# Patient Record
Sex: MALE | Race: WHITE | NOT HISPANIC OR LATINO | URBAN - METROPOLITAN AREA
[De-identification: names, ages, dates, MRNs, and addresses within clinical notes are randomized per-mention and may not be internally consistent; named-entity substitution may affect disease eponyms.]

---

## 2021-07-29 ENCOUNTER — INPATIENT (INPATIENT)
Facility: HOSPITAL | Age: 86
LOS: 7 days | Discharge: HOME | DRG: 215 | End: 2021-08-06
Attending: STUDENT IN AN ORGANIZED HEALTH CARE EDUCATION/TRAINING PROGRAM | Admitting: STUDENT IN AN ORGANIZED HEALTH CARE EDUCATION/TRAINING PROGRAM
Payer: MEDICARE

## 2021-07-29 VITALS
DIASTOLIC BLOOD PRESSURE: 104 MMHG | HEART RATE: 144 BPM | OXYGEN SATURATION: 97 % | RESPIRATION RATE: 20 BRPM | WEIGHT: 190.04 LBS | TEMPERATURE: 98 F | HEIGHT: 70 IN | SYSTOLIC BLOOD PRESSURE: 151 MMHG

## 2021-07-29 LAB
ALBUMIN SERPL ELPH-MCNC: 4 G/DL — SIGNIFICANT CHANGE UP (ref 3.3–5.2)
ALP SERPL-CCNC: 91 U/L — SIGNIFICANT CHANGE UP (ref 40–120)
ALT FLD-CCNC: 26 U/L — SIGNIFICANT CHANGE UP
ANION GAP SERPL CALC-SCNC: 13 MMOL/L — SIGNIFICANT CHANGE UP (ref 5–17)
APTT BLD: 31.3 SEC — SIGNIFICANT CHANGE UP (ref 27.5–35.5)
AST SERPL-CCNC: 65 U/L — HIGH
BASOPHILS # BLD AUTO: 0.05 K/UL — SIGNIFICANT CHANGE UP (ref 0–0.2)
BASOPHILS NFR BLD AUTO: 0.5 % — SIGNIFICANT CHANGE UP (ref 0–2)
BILIRUB SERPL-MCNC: 1 MG/DL — SIGNIFICANT CHANGE UP (ref 0.4–2)
BUN SERPL-MCNC: 21.2 MG/DL — HIGH (ref 8–20)
CALCIUM SERPL-MCNC: 9.4 MG/DL — SIGNIFICANT CHANGE UP (ref 8.6–10.2)
CHLORIDE SERPL-SCNC: 102 MMOL/L — SIGNIFICANT CHANGE UP (ref 98–107)
CO2 SERPL-SCNC: 22 MMOL/L — SIGNIFICANT CHANGE UP (ref 22–29)
CREAT SERPL-MCNC: 1 MG/DL — SIGNIFICANT CHANGE UP (ref 0.5–1.3)
EOSINOPHIL # BLD AUTO: 0.1 K/UL — SIGNIFICANT CHANGE UP (ref 0–0.5)
EOSINOPHIL NFR BLD AUTO: 1 % — SIGNIFICANT CHANGE UP (ref 0–6)
GLUCOSE SERPL-MCNC: 116 MG/DL — HIGH (ref 70–99)
HCT VFR BLD CALC: 46 % — SIGNIFICANT CHANGE UP (ref 39–50)
HGB BLD-MCNC: 15.2 G/DL — SIGNIFICANT CHANGE UP (ref 13–17)
IMM GRANULOCYTES NFR BLD AUTO: 0.4 % — SIGNIFICANT CHANGE UP (ref 0–1.5)
INR BLD: 1.06 RATIO — SIGNIFICANT CHANGE UP (ref 0.88–1.16)
LYMPHOCYTES # BLD AUTO: 2.48 K/UL — SIGNIFICANT CHANGE UP (ref 1–3.3)
LYMPHOCYTES # BLD AUTO: 25.1 % — SIGNIFICANT CHANGE UP (ref 13–44)
MAGNESIUM SERPL-MCNC: 2 MG/DL — SIGNIFICANT CHANGE UP (ref 1.6–2.6)
MCHC RBC-ENTMCNC: 30.2 PG — SIGNIFICANT CHANGE UP (ref 27–34)
MCHC RBC-ENTMCNC: 33 GM/DL — SIGNIFICANT CHANGE UP (ref 32–36)
MCV RBC AUTO: 91.3 FL — SIGNIFICANT CHANGE UP (ref 80–100)
MONOCYTES # BLD AUTO: 0.9 K/UL — SIGNIFICANT CHANGE UP (ref 0–0.9)
MONOCYTES NFR BLD AUTO: 9.1 % — SIGNIFICANT CHANGE UP (ref 2–14)
NEUTROPHILS # BLD AUTO: 6.3 K/UL — SIGNIFICANT CHANGE UP (ref 1.8–7.4)
NEUTROPHILS NFR BLD AUTO: 63.9 % — SIGNIFICANT CHANGE UP (ref 43–77)
NT-PROBNP SERPL-SCNC: 9980 PG/ML — HIGH (ref 0–300)
PLATELET # BLD AUTO: 183 K/UL — SIGNIFICANT CHANGE UP (ref 150–400)
POTASSIUM SERPL-MCNC: 5.1 MMOL/L — SIGNIFICANT CHANGE UP (ref 3.5–5.3)
POTASSIUM SERPL-SCNC: 5.1 MMOL/L — SIGNIFICANT CHANGE UP (ref 3.5–5.3)
PROT SERPL-MCNC: 6.8 G/DL — SIGNIFICANT CHANGE UP (ref 6.6–8.7)
PROTHROM AB SERPL-ACNC: 12.3 SEC — SIGNIFICANT CHANGE UP (ref 10.6–13.6)
RBC # BLD: 5.04 M/UL — SIGNIFICANT CHANGE UP (ref 4.2–5.8)
RBC # FLD: 13.8 % — SIGNIFICANT CHANGE UP (ref 10.3–14.5)
SODIUM SERPL-SCNC: 137 MMOL/L — SIGNIFICANT CHANGE UP (ref 135–145)
TROPONIN T SERPL-MCNC: 0.34 NG/ML — HIGH (ref 0–0.06)
WBC # BLD: 9.87 K/UL — SIGNIFICANT CHANGE UP (ref 3.8–10.5)
WBC # FLD AUTO: 9.87 K/UL — SIGNIFICANT CHANGE UP (ref 3.8–10.5)

## 2021-07-29 PROCEDURE — 99291 CRITICAL CARE FIRST HOUR: CPT

## 2021-07-29 PROCEDURE — 71045 X-RAY EXAM CHEST 1 VIEW: CPT | Mod: 26

## 2021-07-29 RX ORDER — ASPIRIN/CALCIUM CARB/MAGNESIUM 324 MG
162 TABLET ORAL ONCE
Refills: 0 | Status: COMPLETED | OUTPATIENT
Start: 2021-07-29 | End: 2021-07-29

## 2021-07-29 RX ORDER — METOPROLOL TARTRATE 50 MG
25 TABLET ORAL ONCE
Refills: 0 | Status: COMPLETED | OUTPATIENT
Start: 2021-07-29 | End: 2021-07-29

## 2021-07-29 RX ORDER — METOPROLOL TARTRATE 50 MG
5 TABLET ORAL ONCE
Refills: 0 | Status: COMPLETED | OUTPATIENT
Start: 2021-07-29 | End: 2021-07-29

## 2021-07-29 RX ADMIN — Medication 5 MILLIGRAM(S): at 22:50

## 2021-07-29 RX ADMIN — Medication 25 MILLIGRAM(S): at 23:52

## 2021-07-29 NOTE — ED ADULT TRIAGE NOTE - MEANS OF ARRIVAL
Letter by Peter Devries at      Author: Peter Devries Service: -- Author Type: --    Filed:  Encounter Date: 4/11/2019 Status: (Other)          April 11, 2019      Bridgette Dubose  [address]  [place] [address]      Dear Bridgette Dubose,    We have processed your request for proxy access to [employer]. If you did not make a request to dvaide proxy access to an individual, please contact us immediately at [phone].    Through proxy access, your family member or other individual you approve, will be provided secure online access to information regarding your health. Through [employer], they will be able to review instructions from your health care provider, send a secure message to your provider, view test results, manage your appointments and more.    Again, thank you for registering for [employer]. Our team looks forward to partnering with you in managing your medical care and supporting healthy behaviors.     Thank you for choosing [facility].    Sincerely,    [email or web address]    If you have any further questions, please contact our [employer] Support Team by phone [phone] or email, [email or web address].             
stretcher

## 2021-07-30 DIAGNOSIS — I48.91 UNSPECIFIED ATRIAL FIBRILLATION: ICD-10-CM

## 2021-07-30 DIAGNOSIS — I21.4 NON-ST ELEVATION (NSTEMI) MYOCARDIAL INFARCTION: ICD-10-CM

## 2021-07-30 LAB
A1C WITH ESTIMATED AVERAGE GLUCOSE RESULT: 5.5 % — SIGNIFICANT CHANGE UP (ref 4–5.6)
APTT BLD: 171.6 SEC — CRITICAL HIGH (ref 27.5–35.5)
CHOLEST SERPL-MCNC: 176 MG/DL — SIGNIFICANT CHANGE UP
ESTIMATED AVERAGE GLUCOSE: 111 MG/DL — SIGNIFICANT CHANGE UP (ref 68–114)
HCT VFR BLD CALC: 48.7 % — SIGNIFICANT CHANGE UP (ref 39–50)
HDLC SERPL-MCNC: 65 MG/DL — SIGNIFICANT CHANGE UP
HGB BLD-MCNC: 15.7 G/DL — SIGNIFICANT CHANGE UP (ref 13–17)
LIPID PNL WITH DIRECT LDL SERPL: 101 MG/DL — HIGH
MCHC RBC-ENTMCNC: 29.6 PG — SIGNIFICANT CHANGE UP (ref 27–34)
MCHC RBC-ENTMCNC: 32.2 GM/DL — SIGNIFICANT CHANGE UP (ref 32–36)
MCV RBC AUTO: 91.9 FL — SIGNIFICANT CHANGE UP (ref 80–100)
NON HDL CHOLESTEROL: 111 MG/DL — SIGNIFICANT CHANGE UP
PLATELET # BLD AUTO: 191 K/UL — SIGNIFICANT CHANGE UP (ref 150–400)
RAPID RVP RESULT: SIGNIFICANT CHANGE UP
RBC # BLD: 5.3 M/UL — SIGNIFICANT CHANGE UP (ref 4.2–5.8)
RBC # FLD: 14 % — SIGNIFICANT CHANGE UP (ref 10.3–14.5)
SARS-COV-2 RNA SPEC QL NAA+PROBE: SIGNIFICANT CHANGE UP
TRIGL SERPL-MCNC: 50 MG/DL — SIGNIFICANT CHANGE UP
TROPONIN T SERPL-MCNC: 0.49 NG/ML — HIGH (ref 0–0.06)
TSH SERPL-MCNC: 1.04 UIU/ML — SIGNIFICANT CHANGE UP (ref 0.27–4.2)
WBC # BLD: 9.59 K/UL — SIGNIFICANT CHANGE UP (ref 3.8–10.5)
WBC # FLD AUTO: 9.59 K/UL — SIGNIFICANT CHANGE UP (ref 3.8–10.5)

## 2021-07-30 PROCEDURE — 93010 ELECTROCARDIOGRAM REPORT: CPT

## 2021-07-30 PROCEDURE — G0278: CPT

## 2021-07-30 PROCEDURE — 93926 LOWER EXTREMITY STUDY: CPT | Mod: 26,RT

## 2021-07-30 PROCEDURE — 93306 TTE W/DOPPLER COMPLETE: CPT | Mod: 26

## 2021-07-30 PROCEDURE — 93458 L HRT ARTERY/VENTRICLE ANGIO: CPT | Mod: 26

## 2021-07-30 PROCEDURE — 99223 1ST HOSP IP/OBS HIGH 75: CPT

## 2021-07-30 RX ORDER — CLOPIDOGREL BISULFATE 75 MG/1
75 TABLET, FILM COATED ORAL DAILY
Refills: 0 | Status: DISCONTINUED | OUTPATIENT
Start: 2021-07-31 | End: 2021-08-06

## 2021-07-30 RX ORDER — LANOLIN ALCOHOL/MO/W.PET/CERES
3 CREAM (GRAM) TOPICAL AT BEDTIME
Refills: 0 | Status: DISCONTINUED | OUTPATIENT
Start: 2021-07-30 | End: 2021-08-06

## 2021-07-30 RX ORDER — HEPARIN SODIUM 5000 [USP'U]/ML
3000 INJECTION INTRAVENOUS; SUBCUTANEOUS EVERY 6 HOURS
Refills: 0 | Status: DISCONTINUED | OUTPATIENT
Start: 2021-07-30 | End: 2021-07-30

## 2021-07-30 RX ORDER — HEPARIN SODIUM 5000 [USP'U]/ML
6000 INJECTION INTRAVENOUS; SUBCUTANEOUS ONCE
Refills: 0 | Status: COMPLETED | OUTPATIENT
Start: 2021-07-30 | End: 2021-07-30

## 2021-07-30 RX ORDER — HEPARIN SODIUM 5000 [USP'U]/ML
1100 INJECTION INTRAVENOUS; SUBCUTANEOUS
Qty: 25000 | Refills: 0 | Status: DISCONTINUED | OUTPATIENT
Start: 2021-07-30 | End: 2021-08-01

## 2021-07-30 RX ORDER — ATORVASTATIN CALCIUM 80 MG/1
80 TABLET, FILM COATED ORAL AT BEDTIME
Refills: 0 | Status: DISCONTINUED | OUTPATIENT
Start: 2021-07-30 | End: 2021-08-06

## 2021-07-30 RX ORDER — CLOPIDOGREL BISULFATE 75 MG/1
600 TABLET, FILM COATED ORAL ONCE
Refills: 0 | Status: COMPLETED | OUTPATIENT
Start: 2021-07-30 | End: 2021-07-30

## 2021-07-30 RX ORDER — ACETAMINOPHEN 500 MG
650 TABLET ORAL EVERY 6 HOURS
Refills: 0 | Status: DISCONTINUED | OUTPATIENT
Start: 2021-07-30 | End: 2021-08-06

## 2021-07-30 RX ORDER — HEPARIN SODIUM 5000 [USP'U]/ML
6000 INJECTION INTRAVENOUS; SUBCUTANEOUS EVERY 6 HOURS
Refills: 0 | Status: DISCONTINUED | OUTPATIENT
Start: 2021-07-30 | End: 2021-08-01

## 2021-07-30 RX ORDER — HEPARIN SODIUM 5000 [USP'U]/ML
3000 INJECTION INTRAVENOUS; SUBCUTANEOUS EVERY 6 HOURS
Refills: 0 | Status: DISCONTINUED | OUTPATIENT
Start: 2021-07-30 | End: 2021-08-01

## 2021-07-30 RX ORDER — ONDANSETRON 8 MG/1
4 TABLET, FILM COATED ORAL EVERY 8 HOURS
Refills: 0 | Status: DISCONTINUED | OUTPATIENT
Start: 2021-07-30 | End: 2021-08-06

## 2021-07-30 RX ORDER — FUROSEMIDE 40 MG
40 TABLET ORAL DAILY
Refills: 0 | Status: DISCONTINUED | OUTPATIENT
Start: 2021-07-30 | End: 2021-08-02

## 2021-07-30 RX ORDER — METOPROLOL TARTRATE 50 MG
25 TABLET ORAL EVERY 6 HOURS
Refills: 0 | Status: DISCONTINUED | OUTPATIENT
Start: 2021-07-30 | End: 2021-07-31

## 2021-07-30 RX ORDER — ASPIRIN/CALCIUM CARB/MAGNESIUM 324 MG
325 TABLET ORAL DAILY
Refills: 0 | Status: DISCONTINUED | OUTPATIENT
Start: 2021-07-30 | End: 2021-07-30

## 2021-07-30 RX ORDER — HEPARIN SODIUM 5000 [USP'U]/ML
INJECTION INTRAVENOUS; SUBCUTANEOUS
Qty: 25000 | Refills: 0 | Status: DISCONTINUED | OUTPATIENT
Start: 2021-07-30 | End: 2021-07-30

## 2021-07-30 RX ORDER — HEPARIN SODIUM 5000 [USP'U]/ML
6000 INJECTION INTRAVENOUS; SUBCUTANEOUS EVERY 6 HOURS
Refills: 0 | Status: DISCONTINUED | OUTPATIENT
Start: 2021-07-30 | End: 2021-07-30

## 2021-07-30 RX ORDER — ASPIRIN/CALCIUM CARB/MAGNESIUM 324 MG
81 TABLET ORAL DAILY
Refills: 0 | Status: DISCONTINUED | OUTPATIENT
Start: 2021-07-30 | End: 2021-08-06

## 2021-07-30 RX ADMIN — HEPARIN SODIUM 0 UNIT(S)/HR: 5000 INJECTION INTRAVENOUS; SUBCUTANEOUS at 09:15

## 2021-07-30 RX ADMIN — Medication 25 MILLIGRAM(S): at 05:31

## 2021-07-30 RX ADMIN — HEPARIN SODIUM 1100 UNIT(S)/HR: 5000 INJECTION INTRAVENOUS; SUBCUTANEOUS at 19:30

## 2021-07-30 RX ADMIN — Medication 325 MILLIGRAM(S): at 09:02

## 2021-07-30 RX ADMIN — ATORVASTATIN CALCIUM 80 MILLIGRAM(S): 80 TABLET, FILM COATED ORAL at 22:03

## 2021-07-30 RX ADMIN — CLOPIDOGREL BISULFATE 600 MILLIGRAM(S): 75 TABLET, FILM COATED ORAL at 13:25

## 2021-07-30 RX ADMIN — HEPARIN SODIUM 6000 UNIT(S): 5000 INJECTION INTRAVENOUS; SUBCUTANEOUS at 19:32

## 2021-07-30 RX ADMIN — Medication 25 MILLIGRAM(S): at 17:50

## 2021-07-30 RX ADMIN — HEPARIN SODIUM 1300 UNIT(S)/HR: 5000 INJECTION INTRAVENOUS; SUBCUTANEOUS at 01:00

## 2021-07-30 RX ADMIN — HEPARIN SODIUM 6000 UNIT(S): 5000 INJECTION INTRAVENOUS; SUBCUTANEOUS at 00:58

## 2021-07-30 RX ADMIN — Medication 25 MILLIGRAM(S): at 12:19

## 2021-07-30 NOTE — CONSULT NOTE ADULT - PROBLEM SELECTOR RECOMMENDATION 9
Patient received  mg,  mg and SL Nitro x 2. Heparin drip was started. Monitor coagulation panel and daily CBC  Elevated trops x 1 (0.34). Trend trops q6 x 3. CK, serial EKGs  Telemonitor  If recurrent chest pain, give SL Nitro or morphine   Maintain K+>4 and mag >2  Echo to assess structural and functional status   A1C, lipid panel fasting to r/o comorbidities   Dr Rodriguez ,discussed with interventional cardiology on call (Ioana), patient does not need emergent cath at this time, recommend full AC with heparin, tele overnight   Keep NPO for Cath in the AM

## 2021-07-30 NOTE — ED ADULT NURSE NOTE - OBJECTIVE STATEMENT
Pt received A&Ox4 c/o intermittent chest pain radiating b/l shoulders x3 days, worsening today describing as constant pressure associated SOB. Pt given 324 mg ASA and 2 SL nitro PTA in EMS. Pt denies any cp or SOB @ this time. Pt placed on CM in afib w/ @100% RA. Respirations even & unlabored. NAD present. Pt given call bell and instructed on how to properly use system. Pt instructed to use for further assistance.  Pt made aware of plan of care and verbalized understanding.

## 2021-07-30 NOTE — H&P ADULT - ASSESSMENT
How Severe Is Your Cyst?: mild Is This A New Presentation, Or A Follow-Up?: Cysts 87 y/o male with new onset Afib w/ RVR, NSTEMI,

## 2021-07-30 NOTE — CONSULT NOTE ADULT - PROBLEM SELECTOR RECOMMENDATION 2
In the ED patient treated w/ lopressor 5mg IV and 25mg PO, converted to NSR, However he is back to A-fib with HR controlled (88 on monitor)   Telemetry  [id number] VASc: 2 (age) Stroke risk 2.2% per year. Continue full ac with heparin. Monitor coagulation panel and daily CBC  Start metoprolol 25 mg BID x rate control with strict parameters, may give Lopressor 5 mg IVP for HR >125  TFTs  Echo to assess structural and functional status  Maintain K+>4 and mag >2    Further recommendations base on above findings In the ED patient treated w/ lopressor 5mg IV and 25mg PO, converted to NSR, However he is back to A-fib with HR controlled (88 on monitor)   Telemetry  [id number] VASc: 2 (age) Stroke risk 2.2% per year. Continue full ac with heparin. Monitor coagulation panel and daily CBC  Start metoprolol 25 mg BID x rate control with strict parameters, may give Lopressor 5 mg IVP for HR >125  TFTs  Echo to assess structural and functional status  Maintain K+>4 and mag >2    Further recommendations base on above findings  Case discussed with Dr Beht

## 2021-07-30 NOTE — CHART NOTE - NSCHARTNOTEFT_GEN_A_CORE
patient seen at bedside post cath with jaky.     vitals reveiwed  pe  LE: right le cool to touch    arterial duplex performed appreciated  - heparin drip to be restarted at 6pm     tte - Summary:   1. Endocardial visualization was enhanced with intravenous echo contrast.   2. Left ventricular ejection fraction, by visual estimation, is 20 to 25%.   3. Severely decreased global left ventricular systolic function.    a.p  87 y/o male with new onset Afib w/ RVR, NSTEMI,      Problem/Plan - 1:  ·  Problem: Atrial fibrillation with RVR.  metoprolol   - tte as above  - c.w tele  - heparin drip for elevated chads vasc     Problem/Plan - 2:  ·  Problem: NSTEMI (non-ST elevated myocardial infarction).  s/p cath today   - cath report:    DIAGNOSTIC IMPRESSIONS: There is significant triple vessel coronary artery  disease.  Prox LAD=99% (REYNALDO 2)  Ostial Diag=90%  Prox LPL=75%  Prox to Mid RCA=75% Left ventricular function is abnormal.  LVEF=30%  Patent Femoral Arteries  DIAGNOSTIC RECOMMENDATIONS: The patient should continue with the present  medications. Patient management should include aggressive medical therapy  and close monitoring of BUN and creatinine.  Will attempt PCI to the LAD and Diag with Impella Support  - asa and heparin drip  - lipitor  - need entresto?  plan as per cardio     #PAD - arterial duplex appreciated  - heparin drip   statin

## 2021-07-30 NOTE — H&P ADULT - PROBLEM SELECTOR PLAN 2
Likely on the basis of supply/demand mismatch from afib w/rvr, trend CE/EKG, check 2D echo, cont. high intensity statin, check A1c/FLP, cont. aspirin, npo except meds for possible LHC

## 2021-07-30 NOTE — CHART NOTE - NSCHARTNOTEFT_GEN_A_CORE
Department of Cardiology                                                                  [facility]/[place] [address] [address] [facility]                                                            Telephone: [phone]. Fax:[phone]                                                                                   Pre Cath Note    88y Male     Planned Procedure: [facility]    Pertinent Prior Medications:        Antiplatelet: N/A       Aspirin: N/A       Statin: N/A       Beta Blocker: N/A       CCB: N/A       Other Antianginal: N/A       ACEI: N/A    ASA: 2  Mallampati: 2  CCS Class: 4  GFR: 67  Adjusted Bleeding Risk: 2.3%    HPI: 89 y/o male from home with 4 days of intermittent CP/Palpitations associated with activity and rest. Denies any recent illnesses, or travel. No sick contacts. He denies seeing a doctor for about 15 years, denies any surgeries, or any medications. He is full functional and does not use assistive devices. No GI/ symptoms. In the ED noted to be with Afib w/RVR, and initial trop of .34. CP free after SL nitro. Started on po BB, Heparin and Aspirin. Seen by Cardiology and MICU and at this time to be admitted to [facility] for further w/u.  (30 Jul 2021 04:22)    Nuclear Stress Test: N/A    Echo: N/A     Allergies: No Known Allergies    PAST MEDICAL & SURGICAL HISTORY:  No pertinent past medical history  No significant past surgical history    Physical Examination:   General: Awake, alert, speech clear, no acute distress.  Neck: No bruit, normal jugular venous pressures  Chest: Clear CTA, S1, S2, no murmur, RRR  Extremities: No edema                          15.7   9.59  )-----------( 191      ( 30 Jul 2021 08:01 )             48.7     07-29    137  |  102  |  21.2  ----------------------------<  116  5.1   |  22.0  |  1.00    Ca    9.4      29 Jul 2021 22:56  Mg     2.0     07-29    TPro  6.8  /  Alb  4.0  /  TBili  1.0  /  DBili  x   /  AST  65  /  ALT  26  /  AlkPhos  91  07-29    CARDIAC MARKERS ( 30 Jul 2021 08:01 ):  0.49 ng/mL    CARDIAC MARKERS ( 29 Jul 2021 22:56 ): 0.34 ng/mL         PT/INR - ( 29 Jul 2021 22:56 )   PT: 12.3 sec;   INR: 1.06 ratio    PTT - ( 30 Jul 2021 08:01 )  PTT:171.6 sec      Assessment and Plan:   The patient was examined and interviewed by me today. There has been no change from the original history and physical except as noted above.    Problem List:   1. Unstable angina  [facility] and possible intervention Department of Cardiology                                                                  [facility]/[place] [address] [address] [facility]                                                            Telephone: [phone]. Fax:[phone]                                                                                   Pre Cath Note    88y Male     Planned Procedure: [facility]    Pertinent Prior Medications:        Antiplatelet: N/A       Aspirin: N/A       Statin: N/A       Beta Blocker: N/A       CCB: N/A       Other Antianginal: N/A       ACEI: N/A    ASA: 2  Mallampati: 2  CCS Class: 4  GFR: 67  Adjusted Bleeding Risk: 2.3%    HPI: 87 yo M with no PMHx presents to the ER co chest pain x 3 days. Patient states intermittent pain  x  3 days but since this evening having constant pressure like pain in the chest 7/10 radiating to the bilateral shoulders  associated with shortness of breath. Chest pain get worse with activity. Denies alleviating factors. Found to be in AF with RVR by EMS. Treated with 324mg aspirin and SL ntg x 2 prior to ED arrival with some improvement in symptoms. Denies palpitations, dizziness, fatigue, syncope or abdominal pain.     Nuclear Stress Test: N/A    Echo: N/A     Allergies: No Known Allergies    PAST MEDICAL & SURGICAL HISTORY:  No pertinent past medical history  No significant past surgical history    Physical Examination:   General: Awake, alert, speech clear, no acute distress.  Neck: No bruit, normal jugular venous pressures  Chest: Clear CTA, S1, S2, no murmur, irregular  Extremities: No edema                          15.7   9.59  )-----------( 191      ( 30 Jul 2021 08:01 )             48.7     07-29    137  |  102  |  21.2  ----------------------------<  116  5.1   |  22.0  |  1.00    Ca    9.4      29 Jul 2021 22:56  Mg     2.0     07-29    TPro  6.8  /  Alb  4.0  /  TBili  1.0  /  DBili  x   /  AST  65  /  ALT  26  /  AlkPhos  91  07-29    CARDIAC MARKERS ( 30 Jul 2021 08:01 ):  0.49 ng/mL    CARDIAC MARKERS ( 29 Jul 2021 22:56 ): 0.34 ng/mL         PT/INR - ( 29 Jul 2021 22:56 )   PT: 12.3 sec;   INR: 1.06 ratio    PTT - ( 30 Jul 2021 08:01 )  PTT:171.6 sec      Assessment and Plan:   The patient was examined and interviewed by me today. There has been no change from the original history and physical except as noted above.    Problem List:   1. Unstable angina  C and possible intervention    2. AF  Restart heparin drip after procedure

## 2021-07-30 NOTE — PROGRESS NOTE ADULT - ASSESSMENT
A/P:   89 yo M with no PMHx presents to the ER co chest pain x 3 days. Patient states intermittent pain  x  3 days but since this evening having constant pressure like pain in the chest 7/10 radiating to the bilateral shoulders  associated with shortness of breath. Chest pain get worse with activity. Denies alleviating factors. Found to be in AF with RVR by EMS. Treated with 324mg aspirin and SL ntg x 2 prior to ED arrival with some improvement in symptoms. Denies palpitations, dizziness, fatigue, syncope or abdominal pain. Troponin positive x 2. Now s/p LHC via RFA (intial attempt via RRA) which revealed significant triple vessel coronary artery disease.  -Admit to tele  -Plan is for PCI to the LAD and Diag with Impella Support next week  -Bedrest x 4 hours post removal of RFA sheath until 1600 then OOB as tolerated  -Patient may sit up three hours post removal of RFA sheath at 1500  -Start Heparin infusion 6 hours post removal of RFA sheath at 1800 if cath site benign; no intial heparin bolus  -Additional plan as per Attending MD and Cardiology team       A/P:   89 yo M with no PMHx presents to the ER co chest pain x 3 days. Patient states intermittent pain  x  3 days but since this evening having constant pressure like pain in the chest 7/10 radiating to the bilateral shoulders  associated with shortness of breath. Chest pain get worse with activity. Denies alleviating factors. Found to be in AF with RVR by EMS. Treated with 324mg aspirin and SL ntg x 2 prior to ED arrival with some improvement in symptoms. Denies palpitations, dizziness, fatigue, syncope or abdominal pain. Troponin positive x 2. Now s/p LHC via RFA (intial attempt via RRA) which revealed significant triple vessel coronary artery disease.  -Admit to tele  -Plan is for PCI to the LAD and Diag with Impella Support next week   -Bedrest x 4 hours post removal of RFA sheath until 1600 then OOB as tolerated  -Patient may sit up three hours post removal of RFA sheath at 1500  -Start Heparin infusion 6 hours post removal of RFA sheath at 1800 if cath site benign; no intial heparin bolus  -Plavix 600mg load PO x 1 now then 75 mg daily   -ASA 81 mg PO daily  -Maintain Statin  -Additional plan as per Attending MD and Cardiology team

## 2021-07-30 NOTE — H&P ADULT - HISTORY OF PRESENT ILLNESS
87 y/o male from home with 4 days of intermittent CP/Palpitations associated with activity and rest. Denies any recent illnesses, or travel. No sick contacts. He denies seeing a doctor for about 15 years, denies any surgeries, or any medications. He is full functional and does not use assistive devices. No GI/ symptoms. In the ED noted to be with Afib w/RVR, and initial trop of .34. CP free after SL nitro. Started on po BB, Heparin and Aspirin. Seen by Cardiology and MICU and at this time to be admitted to tele for further w/u.

## 2021-07-30 NOTE — H&P ADULT - PROBLEM SELECTOR PLAN 1
Admit to tele, cont. A/C, cont. PO BB based on response and NSTEMI, f/u 2 D echo, check TSH, VC boots, oob/ambulate with assistance.

## 2021-07-30 NOTE — PROGRESS NOTE ADULT - SUBJECTIVE AND OBJECTIVE BOX
Cardiology NP post procedure note:     -s/p LHC via RFA (intial attempt via RRA):    There is significant triple vessel coronary artery  disease.  Prox LAD=99% (REYNALDO 2)  Ostial Diag=90%  Prox LPL=75%  Prox to Mid RCA=75% Left ventricular function is abnormal.  LVEF=30%  Patent Femoral Arteries      TELE: afib 90s    MEDICATIONS  (STANDING):  aspirin enteric coated 325 milliGRAM(s) Oral daily  atorvastatin 80 milliGRAM(s) Oral at bedtime  heparin  Infusion. 1100 Unit(s)/Hr (11 mL/Hr) IV Continuous <Continuous>  metoprolol tartrate 25 milliGRAM(s) Oral every 6 hours    MEDICATIONS  (PRN):  acetaminophen   Tablet .. 650 milliGRAM(s) Oral every 6 hours PRN Temp greater or equal to 38.5C (101.3F), Mild Pain (1 - 3)  aluminum hydroxide/magnesium hydroxide/simethicone Suspension 30 milliLiter(s) Oral every 4 hours PRN Dyspepsia  heparin   Injectable 6000 Unit(s) IV Push every 6 hours PRN For aPTT less than 40  heparin   Injectable 3000 Unit(s) IV Push every 6 hours PRN For aPTT between 40 - 57  melatonin 3 milliGRAM(s) Oral at bedtime PRN Insomnia  ondansetron Injectable 4 milliGRAM(s) IV Push every 8 hours PRN Nausea and/or Vomiting      Allergies:  No Known Allergies    PAST MEDICAL & SURGICAL HISTORY:  No pertinent past medical history    No significant past surgical history        Vital Signs Last 24 Hrs  T(C): 36.6 (2021 09:02), Max: 36.8 (2021 22:38)  T(F): 97.9 (2021 09:02), Max: 98.2 (2021 22:38)  HR: 90 (2021 11:40) (90 - 144)  BP: 117/95 (2021 11:40) (109/80 - 151/104)  BP(mean): 93 (2021 04:22) (93 - 93)  RR: 16 (2021 11:40) (16 - 20)  SpO2: 95% (2021 11:40) (94% - 100%)    Physical Exam:  Constitutional: NAD, AAOx3  Cardiovascular: +S1S2 RRR  Pulmonary: CTA b/l, unlabored  GI: soft NTND +BS  Extremities: no pedal edema, +distal pulses b/l via doppler; bilateral feet cool to touch pre and post procedure unchanged  Neuro: non focal, PINEDA x4  Procedure sites: RFA sheath removed by RN; site benign without hematoma/bleeding; no bruit; + right PP via doppler; RRA attempted site benign--RUE warm/mobile/acyanotic with +right radial pulse        LABS:                        15.7   9.59  )-----------( 191      ( 2021 08:01 )             48.7         137  |  102  |  21.2<H>  ----------------------------<  116<H>  5.1   |  22.0  |  1.00    Ca    9.4      2021 22:56  Mg     2.0         TPro  6.8  /  Alb  4.0  /  TBili  1.0  /  DBili  x   /  AST  65<H>  /  ALT  26  /  AlkPhos  91      PT/INR - ( 2021 22:56 )   PT: 12.3 sec;   INR: 1.06 ratio         PTT - ( 2021 08:01 )  PTT:171.6 sec      RADIOLOGY & ADDITIONAL TESTS:  < from: Cardiac Cath Lab - Adult (21 @ 10:28) >  [facility]  Department of Cardiology  [address] [place] [address]  [phone]  Cath Lab Report -- Comprehensive Report  Patient: TEMO VEGA  Study date: 2021  Account number: [id number]  MR number: [id number]  : 1933  Gender: Male  Race: W  Case Physician(s):  Shahzad Acevedo MD  Referring Physician:  INDICATIONS: Initial NSTEMI.  HISTORY: 89 yo man with poor health care. Presented with atrial  fibrillation, chest pain and a NSTEMI. Echo revealed severely reduced LV  function. No history of previous myocardial infarction. The patient has  hypertension.  PROCEDURE:  --  Left heart catheterization with ventriculography.  --  Right coronary angiography.  --  Left coronary angiography.  --  Failed Access Attempt.  --  Abdominal Aortogram with Bilateral Runoffs.  TECHNIQUE: The risks and alternatives of the procedures and conscious  sedation were explained to the patient and informed consent was obtained.  Cardiac catheterization performed electively.  Local anesthetic given. Right femoral artery access. Left heart  catheterization. Ventriculography was performed. Right coronary artery  angiography. The vessel was injected utilizing a catheter. Left coronary  artery angiography. The vessel was injected utilizing a catheter. Failed  Access Attempt. Abdominal Aortogram with Bilateral Runoffs. RADIATION  EXPOSURE: 3.3 min.  CONTRAST GIVEN: Omnipaque 45 ml.  MEDICATIONS GIVEN: 1% Lidocaine, 10 ml, subcutaneously. 1% Lidocaine, 10  ml, subcutaneously. 0.9NS, 50 ml, IV.  VENTRICLES: EF calculated by contrast ventriculography was 30 %.  VALVES: AORTIC VALVE: No significant aortic valve gradient.  CORONARY VESSELS: The coronary circulation is right dominant.  LM:   --LM: The vessel was normal sized, not calcified, and not tortuous.  Angiography showed no evidence of disease.  LAD:   --  LAD: The vessel was normal sized, moderately calcified, and not  tortuous. Angiography showed severe atherosclerosis. There was a tubular  99 % stenosis in the proximal third of the vessel segment, just before S1.  The lesion was associated with a small filling defect consistent with  thrombus. There was REYNALDO grade 2 flow through the vessel (partial  perfusion). This lesion rojelio likely culprit for the patient's recent  myocardial infarction. It appears amenable to percutaneous intervention.  CX:   --  Circumflex: The vessel was normal sized, mildly calcified, and  mildly tortuous. Angiography showed minor luminal irregularities with no  flow limiting lesions.  --  LPL1: The vessel was normal sized and mildly calcified. Angiography  showed moderate atherosclerosis. There was a tubular 75 % stenosis in the  proximal third of the vessel segment. There was REYNALDO grade 3 flow through  the vessel (brisk flow).  RCA:   --  RCA: The vessel was medium sized, moderately calcified, and not  tortuous. Angiography showed severe atherosclerosis. There was a diffuse  75 % stenosis in the proximal third of the vessel segment. The lesion was  without evidence of thrombus. There was REYNALDO grade 3 flow through the  vessel (brisk flow). In a second lesion, there was a diffuse 75 % stenosis  in the middle third of the vessel segment. There was REYNALDO grade 3 flow  through the vessel (brisk flow).  LEFT LOWER EXTREMITY VESSELS: Left common iliac: Angiography showed mild  atherosclerosis. Left external iliac: Angiography showed mild  atherosclerosis. Left common femoral: Angiography showed mild  atherosclerosis.  RIGHT LOWER EXTREMITY VESSELS: Right common iliac: Angiography showed mild  atherosclerosis. Right external iliac: Angiography showed mild  atherosclerosis. Right common femoral: Angiography showed mild  atherosclerosis.  COMPLICATIONS: No complications occurred during the cath lab visit.  DIAGNOSTIC IMPRESSIONS: There is significant triple vessel coronary artery  disease.  Prox LAD=99% (REYNALDO 2)  Ostial Diag=90%  Prox LPL=75%  Prox to Mid RCA=75% Left ventricular function is abnormal.  LVEF=30%  Patent Femoral Arteries  DIAGNOSTIC RECOMMENDATIONS: The patient should continue with the present  medications. Patient management should include aggressive medical therapy  and close monitoring of BUN and creatinine.  Will attempt PCI to the LAD and Diag with Impella Support  Prepared and signed by  Shahzad Acevedo MD  Signed 2021 11:43:01  HEMODYNAMIC TABLES  Pressures:  NO PHASE  Pressures:  - HR: 96  Pressures:  - Rhythm:  Pressures:  -- Aortic Pressure (S/D/M): 128/99/112  Pressures:  -- Left Ventricle (s/edp): 135/25/--  Outputs:  NO PHASE  Outputs:  -- CALCULATIONS[age]  Outputs:  -- CALCULATIONS: Body Surface Area: 1.88  Outputs:  -- CALCULATIONS: Height in cm: 178.00  Outputs:  -- CALCULATIONS: Sex: Male  Outputs:  -- CALCULATIONS: Weight in k.80  Outputs:  -- OUTPUTS: O2 consumption: 235.01  Outputs:  -- OUTPUTS: Vo2 Indexed: 125.00    < end of copied text >

## 2021-07-30 NOTE — ED ADULT NURSE NOTE - ED STAT RN HANDOFF DETAILS
Report given to ESSU LORETA Vargas. Pt brought to CDU3R with telebox. Respirations even & unlabored. NAD present. Pt made aware of plan of care and verbalized understanding.

## 2021-07-30 NOTE — CONSULT NOTE ADULT - ATTENDING COMMENTS
88M history as listed has no medical care >15 years previously live in [facility] then wife  last October, currently living with his daughter (Lynn) on [address], p/w recurrent chest pain, initial Trop 0.3 and EKG noted anterior precordial Q-wave and pAfib RVR, TTE done noted severe ischemic cardiomyopathy, LV EF 25% underwent diagnostic angiogram found with severely calcified/stenotic pLAD 99%, RCA 75% and LPL1 75%, planning for Impella-assisted PCI of LAD lesion next week.   -trend Troponin until ewa, continue heparin gtt and ASA/clopidogrel/statin, obtain LE arterial/venous Duplex for Impella planning per Dr. Acevedo  -LVEDP 24 and pBNP 9K- start IV Lasix 40mg daily, monitor Cr.   -continue metoprolol for pAfib  -gradual addition of GDMT after PCI  -discussed with patient's daughter over the phone        Jose De Jesus Beth DO, [facility]  Faculty Non-Invasive Cardiologist  [phone]

## 2021-07-30 NOTE — CONSULT NOTE ADULT - ASSESSMENT
89 y/o M w/ no known PMHx admitted w/:    1. NSTEMI    PLAN:  - Hemodynamically stable.  - Heparin gtt per ACS protocol.  - Cardiology consult for ischemic work-up.  - TTE.  - Start ASA, beta blocker, and statin.    At this time, pt does not require ICU level of care. Please re-consult should pt's condition deteriorate.    Case discussed w/ ICU attending, Dr. Arnold.
87 yo M with no PMHx presents to the ER co chest pain x 3 days.   Found to be in AF with RVR by EMS. Treated with 324mg aspirin and SL ntg x 2 prior to ED arrival with some improvement in symptoms.   In the ED patient treated w/ lopressor 5mg IV and 25mg PO, converted to NSR.     Of note: Patient doesn't follow any Dr.

## 2021-07-30 NOTE — ED PROVIDER NOTE - OBJECTIVE STATEMENT
88yom with no PMH p/w chest pain. Has been having pain on and off for the past 3 days but since this evening having constant pressure like pain in the chest radiating to the bilateral shoulders associated with shortness of breath. Found to be in AF with RVR by EMS. Treated with 324mg aspirin and SL ntg x 2 prior to ED arrival with some improvement in symptoms.

## 2021-07-30 NOTE — ED ADULT NURSE NOTE - ALCOHOL PRE SCREEN (AUDIT - C)
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 No indication at this time for catheter directed tPA but would monitor closely w/ trending troponins, oxygen saturation, and HR  - If any change in hemodynamics or if severely tachycardic/hypoxic with ambulation tomorrow will consider catheter directed tpA. Transition to heparin gtt in case of future procedure required. If no procedure deemed necessary, can transition to PO a/c, likely eliquis  - F/U official echo  - Would obtain hypercoaguable work up in setting of second unprovoked PE, may have increased risk w/ neurofibromatosis (case reports of portal vein thrombosis), consider heme/onc follow up, but will need lifelong anticoagulation  - F/U LE DVT study Statement Selected

## 2021-07-30 NOTE — CONSULT NOTE ADULT - SUBJECTIVE AND OBJECTIVE BOX
Patient is a 89 y/o male who presents with a chief complaint of chest pain.    BRIEF HOSPITAL COURSE: 89 y/o M w/ no known PMHx who presented to the ED c/o chest pain x 4 days. Pt stated that the pain was not constant, but acutely worsened yesterday evening prompting ED presentation. Pt stated that the pain radiated to his left arm, shoulder, and back. Pain was associated w/ nausea. Pt denied diaphoresis and shortness of breath. Upon arrival to the ED, pt found to be in a fib w/ RVR. Treated w/ lopressor 5mg IV and 25mg PO, converted to NSR. EKG significant for multiple T wave inversions. Labs revealing of elevated troponin (0.34) and elevated BNP (9980). Pt received ASA 162mg PO and was started on a heparin infusion. ICU consult placed for NSTEMI.    PAST MEDICAL & SURGICAL HISTORY:  No pertinent past medical history    No significant past surgical history    Allergies  No Known Allergies    Intolerances    FAMILY HISTORY:  Unknown    SOCIAL HISTORY:   Lives w/ daughter, completes ADLs independently. Hx of tobacco abuse, quit 25-30 years ago. No hx of EtOH or illicit drug abuse.    Review of Systems:  CONSTITUTIONAL: No fever, chills, or fatigue.  EYES: No eye pain, visual disturbances, or discharge.  ENMT:  No difficulty hearing, tinnitus, or vertigo. No sinus or throat pain.  NECK: No pain or stiffness.  RESPIRATORY: No shortness of breath, cough, or wheezing.  CARDIOVASCULAR: No chest pain, palpitations, dizziness, or leg swelling.  GASTROINTESTINAL: No abdominal or epigastric pain. No nausea, vomiting,  diarrhea, or constipation. No hematemesis, melena, or hematochezia.  GENITOURINARY: No dysuria, frequency, hematuria, or incontinence.  NEUROLOGICAL: No headaches, memory loss, loss of strength, numbness, or tremors.  SKIN: No itching, burning, rashes, or lesions.  MUSCULOSKELETAL: No joint pain or swelling; No muscle, back, or extremity pain.  PSYCHIATRIC: No depression, anxiety, mood swings, or difficulty sleeping.    Medications:  heparin   Injectable 6000 Unit(s) IV Push every 6 hours PRN  heparin   Injectable 3000 Unit(s) IV Push every 6 hours PRN  heparin  Infusion.  Unit(s)/Hr IV Continuous <Continuous>    ICU Vital Signs Last 24 Hrs  T(C): 36.8 (29 Jul 2021 22:38), Max: 36.8 (29 Jul 2021 22:38)  T(F): 98.2 (29 Jul 2021 22:38), Max: 98.2 (29 Jul 2021 22:38)  HR: 112 (29 Jul 2021 22:57) (112 - 144)  BP: 139/89 (29 Jul 2021 22:57) (139/89 - 151/104)  BP(mean): --  ABP: --  ABP(mean): --  RR: 20 (29 Jul 2021 22:57) (20 - 20)  SpO2: 97% (29 Jul 2021 22:57) (97% - 97%)    Vital Signs Last 24 Hrs  T(C): 36.8 (29 Jul 2021 22:38), Max: 36.8 (29 Jul 2021 22:38)  T(F): 98.2 (29 Jul 2021 22:38), Max: 98.2 (29 Jul 2021 22:38)  HR: 112 (29 Jul 2021 22:57) (112 - 144)  BP: 139/89 (29 Jul 2021 22:57) (139/89 - 151/104)  BP(mean): --  RR: 20 (29 Jul 2021 22:57) (20 - 20)  SpO2: 97% (29 Jul 2021 22:57) (97% - 97%)    I&O's Detail    LABS:                        15.2   9.87  )-----------( 183      ( 29 Jul 2021 22:56 )             46.0     07-29    137  |  102  |  21.2<H>  ----------------------------<  116<H>  5.1   |  22.0  |  1.00    Ca    9.4      29 Jul 2021 22:56  Mg     2.0     07-29    TPro  6.8  /  Alb  4.0  /  TBili  1.0  /  DBili  x   /  AST  65<H>  /  ALT  26  /  AlkPhos  91  07-29    CARDIAC MARKERS ( 29 Jul 2021 22:56 )  x     / 0.34 ng/mL / x     / x     / x        CAPILLARY BLOOD GLUCOSE    PT/INR - ( 29 Jul 2021 22:56 )   PT: 12.3 sec;   INR: 1.06 ratio    PTT - ( 29 Jul 2021 22:56 )  PTT:31.3 sec    CULTURES:    Physical Examination:    General: Elderly male, sitting comfortably on stretcher.    HEENT: Pupils equal, reactive to light. Symmetric. No scleral icterus or injection.    PULM: Clear to auscultation b/l.    NECK: Supple, no lymphadenopathy, trachea midline.    CVS: Regular rate and rhythm, no murmurs appreciated, +s1/s2.    ABD: Soft, nondistended, nontender, normoactive bowel sounds.    EXT: No edema, nontender.    SKIN: Warm and well perfused, no rashes noted.    NEURO: Alert, oriented, interactive, nonfocal.    
                                                                        [facility]                                                        Office: [address] [address]                                                       Telephone: [phone]. Fax:[phone]    CARDIOLOGY CONSULTATION NOTE                                                                                             History obtained by: Patient and medical record   obtained: No    HPI:  87 yo M with no PMHx presents to the ER co chest pain x 3 days. Patient states intermittent pain  x  3 days but since this evening having constant pressure like pain in the chest 7/10 radiating to the bilateral shoulders  associated with shortness of breath. Chest pain get worse with activity. Denies alleviating factors. Found to be in AF with RVR by EMS. Treated with 324mg aspirin and SL ntg x 2 prior to ED arrival with some improvement in symptoms. Denies palpitations, dizziness, fatigue, syncope or abdominal pain.     In the ED patient treated w/ lopressor 5mg IV and 25mg PO, converted to NSR.     Of note: Patient doesn't follow any Dr.     REVIEW OF SYMPTOMS:   Cardiovascular:  + No chest pain (resolved),  No dyspnea (resolved),  No fatigue, No syncope,  No palpitations, No dizziness, No Orthopnea, No Paroxsymal nocturnal dyspnea.  Respiratory:  No cough.  Genitourinary:  No dysuria, no hematuria.  Gastrointestinal:  + No nausea  (resolved), no vomiting. No diarrhea.  No abdominal pain.   Neurological: No headache, no dizziness, no slurred speech.  Psychiatric: No agitation, no anxiety.    ALL OTHER REVIEW OF SYSTEMS ARE NEGATIVE.    Allergies  No Known Allergies    CURRENT MEDICATIONS:  heparin  Infusion.    Home Medications:  Denies    Past Medical History  No pertinent past medical history    Past Surgical History  No significant past surgical history    FAMILY HISTORY:  Unknown    Social History: + Former smoker (quit 30 years ago).  Denies alcohol or drugs use:    Vital Signs Last 24 Hrs  T(C): 36.8 (29 Jul 2021 22:38), Max: 36.8 (29 Jul 2021 22:38)  T(F): 98.2 (29 Jul 2021 22:38), Max: 98.2 (29 Jul 2021 22:38)  HR: 106 (30 Jul 2021 03:19) (106 - 144)  BP: 145/88 (30 Jul 2021 03:19) (139/89 - 151/104)  BP(mean): --  RR: 18 (30 Jul 2021 03:19) (18 - 20)  SpO2: 100% (30 Jul 2021 03:19) (97% - 100%)    PHYSICAL EXAM:  Constitutional: Comfortable . No acute distress.   HEENT: Atraumatic and normcephalic , neck is supple . no JVD.  PEERL   CNS: A&O x3. No focal neurologic deficits.   Respiratory: CTAB. No wheezing, rhonchi or crackles   Cardiovascular: + Irregular, Irregular S1S2. No murmur or rubs  Gastrointestinal: Soft non-tender and non distended . +Bowel sounds.   Extremities: No pitting  edema x 4 extremities  Psychiatric: Calm . no agitation.    LABS:                        15.2   9.87  )-----------( 183      ( 29 Jul 2021 22:56 )             46.0     07-29    137  |  102  |  21.2<H>  ----------------------------<  116<H>  5.1   |  22.0  |  1.00    Ca    9.4      29 Jul 2021 22:56  Mg     2.0     07-29    TPro  6.8  /  Alb  4.0  /  TBili  1.0  /  DBili  x   /  AST  65<H>  /  ALT  26  /  AlkPhos  91  07-29    CARDIAC MARKERS ( 29 Jul 2021 22:56 )  x     / 0.34 ng/mL / x     / x     / x        ;p-BNP=Serum Pro-Brain Natriuretic Peptide: 9980 pg/mL (07-29 @ 22:56)    PT/INR - ( 29 Jul 2021 22:56 )   PT: 12.3 sec;   INR: 1.06 ratio    PTT - ( 29 Jul 2021 22:56 )  PTT:31.3 sec    EKG: A-fib with RVR. Multiple T waves inversion, q waves and 1 mm ST elevation V2-V3. No prior EKG to compare     RADIOLOGY & ADDITIONAL STUDIES:    Chest x ray: Unremarkable. Official reading / report pending    Preliminary evaluation, please await official recommendations     Assessment and recommendations are final when note is signed by the attending.

## 2021-07-30 NOTE — ED PROVIDER NOTE - CLINICAL SUMMARY MEDICAL DECISION MAKING FREE TEXT BOX
Acute chest pain, ECG with q-waves and 1mm NIXON V2-V3 with biphasic T-waves, some lateral STD. Pain improving after nitro. New onset AF with RVR rate controlled with metoprolol, ecg remains unchanged despite rate control and troponin positive. Discussed with interventional cardiology on call (Ioana), does not need emergent cath at this time, recommend full AC with heparin, tele overnight and full cardiology consult in AM. MICU consulted, stable for tele floor.

## 2021-07-31 DIAGNOSIS — I25.10 ATHEROSCLEROTIC HEART DISEASE OF NATIVE CORONARY ARTERY WITHOUT ANGINA PECTORIS: ICD-10-CM

## 2021-07-31 DIAGNOSIS — I25.5 ISCHEMIC CARDIOMYOPATHY: ICD-10-CM

## 2021-07-31 LAB
ALBUMIN SERPL ELPH-MCNC: 3.8 G/DL — SIGNIFICANT CHANGE UP (ref 3.3–5.2)
ALP SERPL-CCNC: 85 U/L — SIGNIFICANT CHANGE UP (ref 40–120)
ALT FLD-CCNC: 24 U/L — SIGNIFICANT CHANGE UP
ANION GAP SERPL CALC-SCNC: 15 MMOL/L — SIGNIFICANT CHANGE UP (ref 5–17)
APTT BLD: 166.5 SEC — CRITICAL HIGH (ref 27.5–35.5)
APTT BLD: 67.6 SEC — HIGH (ref 27.5–35.5)
APTT BLD: 68.9 SEC — HIGH (ref 27.5–35.5)
AST SERPL-CCNC: 55 U/L — HIGH
BASOPHILS # BLD AUTO: 0.02 K/UL — SIGNIFICANT CHANGE UP (ref 0–0.2)
BASOPHILS NFR BLD AUTO: 0.2 % — SIGNIFICANT CHANGE UP (ref 0–2)
BILIRUB SERPL-MCNC: 1.2 MG/DL — SIGNIFICANT CHANGE UP (ref 0.4–2)
BUN SERPL-MCNC: 35.9 MG/DL — HIGH (ref 8–20)
CALCIUM SERPL-MCNC: 9 MG/DL — SIGNIFICANT CHANGE UP (ref 8.6–10.2)
CHLORIDE SERPL-SCNC: 102 MMOL/L — SIGNIFICANT CHANGE UP (ref 98–107)
CO2 SERPL-SCNC: 22 MMOL/L — SIGNIFICANT CHANGE UP (ref 22–29)
COVID-19 SPIKE DOMAIN AB INTERP: POSITIVE
COVID-19 SPIKE DOMAIN ANTIBODY RESULT: >250 U/ML — HIGH
CREAT SERPL-MCNC: 1.2 MG/DL — SIGNIFICANT CHANGE UP (ref 0.5–1.3)
EOSINOPHIL # BLD AUTO: 0 K/UL — SIGNIFICANT CHANGE UP (ref 0–0.5)
EOSINOPHIL NFR BLD AUTO: 0 % — SIGNIFICANT CHANGE UP (ref 0–6)
GLUCOSE SERPL-MCNC: 107 MG/DL — HIGH (ref 70–99)
HCT VFR BLD CALC: 44 % — SIGNIFICANT CHANGE UP (ref 39–50)
HCT VFR BLD CALC: 44.3 % — SIGNIFICANT CHANGE UP (ref 39–50)
HGB BLD-MCNC: 14.2 G/DL — SIGNIFICANT CHANGE UP (ref 13–17)
HGB BLD-MCNC: 14.5 G/DL — SIGNIFICANT CHANGE UP (ref 13–17)
IMM GRANULOCYTES NFR BLD AUTO: 0.3 % — SIGNIFICANT CHANGE UP (ref 0–1.5)
LYMPHOCYTES # BLD AUTO: 16.6 % — SIGNIFICANT CHANGE UP (ref 13–44)
LYMPHOCYTES # BLD AUTO: 2.11 K/UL — SIGNIFICANT CHANGE UP (ref 1–3.3)
MAGNESIUM SERPL-MCNC: 2 MG/DL — SIGNIFICANT CHANGE UP (ref 1.6–2.6)
MCHC RBC-ENTMCNC: 29.6 PG — SIGNIFICANT CHANGE UP (ref 27–34)
MCHC RBC-ENTMCNC: 29.7 PG — SIGNIFICANT CHANGE UP (ref 27–34)
MCHC RBC-ENTMCNC: 32.1 GM/DL — SIGNIFICANT CHANGE UP (ref 32–36)
MCHC RBC-ENTMCNC: 33 GM/DL — SIGNIFICANT CHANGE UP (ref 32–36)
MCV RBC AUTO: 90 FL — SIGNIFICANT CHANGE UP (ref 80–100)
MCV RBC AUTO: 92.3 FL — SIGNIFICANT CHANGE UP (ref 80–100)
MONOCYTES # BLD AUTO: 1.61 K/UL — HIGH (ref 0–0.9)
MONOCYTES NFR BLD AUTO: 12.6 % — SIGNIFICANT CHANGE UP (ref 2–14)
NEUTROPHILS # BLD AUTO: 8.95 K/UL — HIGH (ref 1.8–7.4)
NEUTROPHILS NFR BLD AUTO: 70.3 % — SIGNIFICANT CHANGE UP (ref 43–77)
PLATELET # BLD AUTO: 161 K/UL — SIGNIFICANT CHANGE UP (ref 150–400)
PLATELET # BLD AUTO: 177 K/UL — SIGNIFICANT CHANGE UP (ref 150–400)
POTASSIUM SERPL-MCNC: 4.2 MMOL/L — SIGNIFICANT CHANGE UP (ref 3.5–5.3)
POTASSIUM SERPL-SCNC: 4.2 MMOL/L — SIGNIFICANT CHANGE UP (ref 3.5–5.3)
PROT SERPL-MCNC: 6.2 G/DL — LOW (ref 6.6–8.7)
RBC # BLD: 4.8 M/UL — SIGNIFICANT CHANGE UP (ref 4.2–5.8)
RBC # BLD: 4.89 M/UL — SIGNIFICANT CHANGE UP (ref 4.2–5.8)
RBC # FLD: 14 % — SIGNIFICANT CHANGE UP (ref 10.3–14.5)
RBC # FLD: 14.1 % — SIGNIFICANT CHANGE UP (ref 10.3–14.5)
SARS-COV-2 IGG+IGM SERPL QL IA: >250 U/ML — HIGH
SARS-COV-2 IGG+IGM SERPL QL IA: POSITIVE
SODIUM SERPL-SCNC: 139 MMOL/L — SIGNIFICANT CHANGE UP (ref 135–145)
WBC # BLD: 10.53 K/UL — HIGH (ref 3.8–10.5)
WBC # BLD: 12.73 K/UL — HIGH (ref 3.8–10.5)
WBC # FLD AUTO: 10.53 K/UL — HIGH (ref 3.8–10.5)
WBC # FLD AUTO: 12.73 K/UL — HIGH (ref 3.8–10.5)

## 2021-07-31 PROCEDURE — 99232 SBSQ HOSP IP/OBS MODERATE 35: CPT

## 2021-07-31 PROCEDURE — 93010 ELECTROCARDIOGRAM REPORT: CPT

## 2021-07-31 RX ORDER — METOPROLOL TARTRATE 50 MG
100 TABLET ORAL DAILY
Refills: 0 | Status: DISCONTINUED | OUTPATIENT
Start: 2021-07-31 | End: 2021-08-06

## 2021-07-31 RX ORDER — SACUBITRIL AND VALSARTAN 24; 26 MG/1; MG/1
1 TABLET, FILM COATED ORAL
Refills: 0 | Status: DISCONTINUED | OUTPATIENT
Start: 2021-07-31 | End: 2021-08-03

## 2021-07-31 RX ADMIN — HEPARIN SODIUM 0 UNIT(S)/HR: 5000 INJECTION INTRAVENOUS; SUBCUTANEOUS at 02:28

## 2021-07-31 RX ADMIN — HEPARIN SODIUM 900 UNIT(S)/HR: 5000 INJECTION INTRAVENOUS; SUBCUTANEOUS at 03:33

## 2021-07-31 RX ADMIN — HEPARIN SODIUM 900 UNIT(S)/HR: 5000 INJECTION INTRAVENOUS; SUBCUTANEOUS at 17:57

## 2021-07-31 RX ADMIN — Medication 40 MILLIGRAM(S): at 05:25

## 2021-07-31 RX ADMIN — HEPARIN SODIUM 900 UNIT(S)/HR: 5000 INJECTION INTRAVENOUS; SUBCUTANEOUS at 10:49

## 2021-07-31 RX ADMIN — Medication 25 MILLIGRAM(S): at 05:26

## 2021-07-31 RX ADMIN — Medication 81 MILLIGRAM(S): at 16:09

## 2021-07-31 RX ADMIN — CLOPIDOGREL BISULFATE 75 MILLIGRAM(S): 75 TABLET, FILM COATED ORAL at 16:09

## 2021-07-31 RX ADMIN — ATORVASTATIN CALCIUM 80 MILLIGRAM(S): 80 TABLET, FILM COATED ORAL at 21:09

## 2021-07-31 NOTE — PROGRESS NOTE ADULT - PROBLEM SELECTOR PLAN 3
-heparin gtt full anticoagulation protocol  -adjusted beta blocker to toprol 100mg daily; pt is rate controlled and tolerating (rates 60s-80s on tele)

## 2021-07-31 NOTE — PROGRESS NOTE ADULT - PROBLEM SELECTOR PLAN 1
-DAPT with aspirin and plavix  -high intensity statin therapy; atorvastatin 80mg daily  -Pt is on schedule for Wednesday  high risk PCI of LAD  -pt initially had right radial artery access that evolved into right femoral artery access; site is soft and mildly tender with+ecchymosis; there is no evidence of pseudoaneurysm on duplex

## 2021-07-31 NOTE — PROGRESS NOTE ADULT - ATTENDING COMMENTS
pt seen and examined  plan of care dw np and pt as well as his daughter  Fairly independent male, with CP now resolved.  Plan for pc ion monday.  Labs and tele reviewed.  hx of afib but was not on ac in the past. On heparin drip for now. Risk of stroke with afib DW pt.   Started HF meds with ICM and Low LVEF

## 2021-07-31 NOTE — PROGRESS NOTE ADULT - ASSESSMENT
89 yo M with no PMHx presents to the ER co chest pain x 3 days presented to ED in AF RVR +NSTEMI, brought to cath lab revealing TVD; pending cardiac catheterization likely impella assisted tentatively for Wednesday 8/4/21.

## 2021-07-31 NOTE — PROGRESS NOTE ADULT - ASSESSMENT
87 y/o male with new onset Afib w/ RVR, NSTEMI.     #CAD s/p LHC, New onset systolic CHF  - Cardio following  - Plan is for PCI to the LAD and Diag with Impella Support  - C/w ASA, Plavix, statin  - C/w Heparin gtt  - ECHO reviewed  - C/w telemetry  - plan per Cardiology    #AFib w/w RVR  - C/w Metoprolol  - ECHO reviewed  - Heparin gtt for elevated CHADsVASC  - Plan per Cardiology    #PAD?  - Lower extremities warm  - Arterial duplex reviewed    DVT ppx: Heparin gtt

## 2021-07-31 NOTE — PROGRESS NOTE ADULT - PROBLEM SELECTOR PLAN 4
-Start low dose entresto with parameters TTE 20-25%   -Increase diuresis lasix 40mg IVP BID; goal net negative 2L (currently net -300?) urine output poor despite IV diureiss  -Strict I&O; daily weight  -Beta blocker; changed to toprol 100mg daily with parameters

## 2021-07-31 NOTE — PROGRESS NOTE ADULT - SUBJECTIVE AND OBJECTIVE BOX
Department of Cardiology                                                                 [facility]/[place] [address] [address] [facility]                                                                 Telephone: [phone]. Fax:[phone]    Cardiology Progress Note  Follow up post cath; seen and examined; daughter at bedside   Overnight events: none  Tele: atrial fibrillation 60-80's    Narrative: 87 yo M with no PMHx presents to the ER co chest pain x 3 days. Patient states intermittent pain  x  3 days but since this evening having constant pressure like pain in the chest 7/10 radiating to the bilateral shoulders associated with shortness of breath. Chest pain get worse with activity. Denies alleviating factors. Found to be in AF with RVR by EMS. Treated with 324mg aspirin and SL ntg x 2 prior to ED arrival with some improvement in symptoms. Denies palpitations, dizziness, fatigue, syncope or abdominal pain. Troponin positive x 2. POD 1 LHC via RFA (failed RRA access) which revealed significant triple vessel coronary artery disease; Prox LAD=99%, Ostial Diag=90%. Prox LPL=75%, Prox to Mid RCA=75% Left ventricular function is abnormal LVEF=30%  	  MEDICATIONS:  furosemide   Injectable 40 milliGRAM(s) IV Push daily  metoprolol tartrate 25 milliGRAM(s) Oral every 6 hours  acetaminophen   Tablet .. 650 milliGRAM(s) Oral every 6 hours PRN  melatonin 3 milliGRAM(s) Oral at bedtime PRN  ondansetron Injectable 4 milliGRAM(s) IV Push every 8 hours PRN  aluminum hydroxide/magnesium hydroxide/simethicone Suspension 30 milliLiter(s) Oral every 4 hours PRN  atorvastatin 80 milliGRAM(s) Oral at bedtime  aspirin  chewable 81 milliGRAM(s) Oral daily  clopidogrel Tablet 75 milliGRAM(s) Oral daily  heparin   Injectable 6000 Unit(s) IV Push every 6 hours PRN  heparin   Injectable 3000 Unit(s) IV Push every 6 hours PRN  heparin  Infusion. 1100 Unit(s)/Hr IV Continuous <Continuous>    PHYSICAL EXAM:  T(C): 36.4 (21 @ 08:30), Max: 36.9 (21 @ 14:00)  HR: 72 (21 @ 08:30) (64 - 108)  BP: 104/78 (21 @ 08:30) (104/78 - 145/74)  RR: 18 (21 @ 08:30) (16 - 18)  SpO2: 94% (21 @ 08:30) (93% - 97%)  Wt(kg): --    I&O's Summary    2021 07:01  -  2021 07:00  --------------------------------------------------------  IN: 204 mL / OUT: 550 mL / NET: -346 mL    2021 07:01  -  2021 11:25  --------------------------------------------------------  IN: 0 mL / OUT: 150 mL / NET: -150 mL    Daily     Daily Weight in k.8 (2021 05:46)    Appearance: Normal	  HEENT:   Normal oral mucosa, PERRL, EOMI	  Lymphatic: No lymphadenopathy  Cardiovascular: Normal S1 S2, No JVD, No murmurs, No edema  Respiratory: Lungs clear to auscultation	  Psychiatry: A & O x 3, Mood & affect appropriate  Gastrointestinal:  Soft, Non-tender, + BS	  Skin: No rashes, No ecchymoses, No cyanosis  Neurologic: Non-focal  Extremities: Normal range of motion, No clubbing, cyanosis or edema  Vascular: Peripheral pulses palpable 2+ bilaterally  RRA access site: no bleeding, no hematoma, +2 pulse  RFA access site: soft, no bleeding, +ecchymosis, mild tenderness    TELEMETRY: atrial fibrillation 60s-80s 	      DIAGNOSTIC TESTING:  [X] Echocardiogram:  < from: TTE Echo Complete w/o Contrast w/ Doppler (21 @ 08:09) >  PHYSICIAN INTERPRETATION:  Left Ventricle: Endocardial visualization was enhanced with intravenous echo contrast. The left ventricular internal cavity size is normal. Left ventricular wall thickness is normal.  Global LV systolic function was severely decreased. Left ventricular ejection fraction, by visual estimation, is 20 to 25%. The mitral in-flow pattern reveals no discernable A-wave, therefore no comment on diastolic function can be made.    LV Wall Scoring:  The mid and apical anterior septum, mid and apical inferior septum, mid and  apical inferior wall, mid inferolateral segment, apical anterior segment, and  apex are akinetic.    Right Ventricle: Normal right ventricular size and function.  Left Atrium: Severely enlarged left atrium.  Right Atrium: Severely enlarged right atrium.  Pericardium: Trivial pericardial effusion is present. The pericardial effusion is globally located around the entire heart.  Mitral Valve: There is mild mitral annular calcification. Mild mitral valve regurgitation is seen.  Tricuspid Valve: Structurally normal tricuspid valve, with normal leaflet excursion. Moderate tricuspid regurgitation is visualized. Estimated pulmonary artery systolic pressure is 49.5 mmHg assuming a right atrial pressure of 8 mmHg, which is consistent with mild pulmonary hypertension.  Aortic Valve: Peak transaortic gradient equals 14.0 mmHg, mean transaortic gradient equals 8.6 mmHg, the calculated aortic valve area equals 0.59 cm² by the continuity equation consistent with severe aortic stenosis. No evidence of aortic valve regurgitation is seen.  Pulmonic Valve: Structurally normal pulmonic valve, with normal leaflet excursion. Tracepulmonic valve regurgitation.  Aorta: The aortic root is normal in size and structure.  Pulmonary Artery: The main pulmonary artery is normal in size.  Venous: The inferior vena cava was dilated, with respiratory size variation greater than 50%.  In comparison to the previous echocardiogram(s): There are no prior studies on this patient for comparison purposes. No prior studies are available for comparison.    Summary:   1. Endocardial visualization was enhanced with intravenous echo contrast.   2. Left ventricular ejection fraction, by visual estimation, is 20 to 25%.   3. Severely decreased global left ventricular systolic function.   4. Multiple left ventricular regional wall motion abnormalities exist. See wall motion findings.   5. Severely enlarged left atrium.   6. Severely enlarged right atrium.   7. Mild mitral annular calcification.   8. Mild mitral valve regurgitation.   9. Moderate tricuspid regurgitation.  10. Peak transaortic gradient equals 14.0 mmHg, mean transaortic gradient equals 8.6 mmHg, the calculated aortic valve area equals 0.59 cm² by the continuity equation consistent with severe aortic stenosis. The Dimensionless Index value is 0.23, also consistent with severe AS.  11. Estimated pulmonary artery systolic pressure is 49.5 mmHg assuming a right atrial pressure of 8 mmHg, which is consistent with mild pulmonary hypertension.  12. Trivial pericardial effusion.  13. No prior studies are available for comparison.    [X]  Catheterization:  < from: Cardiac Cath Lab - Adult (21 @ 10:28) >  VENTRICLES: EF calculated by contrast ventriculography was 30 %.  VALVES: AORTIC VALVE: No significant aortic valve gradient.  CORONARY VESSELS: The coronary circulation is right dominant.  LM:   --LM: The vessel was normal sized, not calcified, and not tortuous.  Angiography showed no evidence of disease.  LAD:   --  LAD: The vessel was normal sized, moderately calcified, and not  tortuous. Angiography showed severe atherosclerosis. There was a tubular  99 % stenosis in the proximal third of the vessel segment, just before S1.  The lesion was associated with a small filling defect consistent with  thrombus. There was REYNALDO grade 2 flow through the vessel (partial  perfusion). This lesion rojelio likely culprit for the patient's recent  myocardial infarction. It appears amenable to percutaneous intervention.  CX:   --  Circumflex: The vessel was normal sized, mildly calcified, and  mildly tortuous. Angiography showed minor luminal irregularities with no  flow limiting lesions.  --  LPL1: The vessel was normal sized and mildly calcified. Angiography  showed moderate atherosclerosis. There was a tubular 75 % stenosis in the  proximal third of the vessel segment. There was REYNALDO grade 3 flow through  the vessel (brisk flow).  RCA:   --  RCA: The vessel was medium sized, moderately calcified, and not  tortuous. Angiography showed severe atherosclerosis. There was a diffuse  75 % stenosis in the proximal third of the vessel segment. The lesion was  without evidence of thrombus. There was REYNALDO grade 3 flow through the  vessel (brisk flow). In a second lesion, there was a diffuse 75 % stenosis  in the middle third of the vessel segment. There was REYNALDO grade 3 flow  through the vessel (brisk flow).  LEFT LOWER EXTREMITY VESSELS: Left common iliac: Angiography showed mild  atherosclerosis. Left external iliac: Angiography showed mild  atherosclerosis. Left common femoral: Angiography showed mild  atherosclerosis.  RIGHT LOWER EXTREMITY VESSELS: Right common iliac: Angiography showed mild  atherosclerosis. Right external iliac: Angiography showed mild  atherosclerosis. Right common femoral: Angiography showed mild  atherosclerosis.  COMPLICATIONS: No complications occurred during the cath lab visit.  DIAGNOSTIC IMPRESSIONS: There is significant triple vessel coronary artery  disease.  Prox LAD=99% (REYNALDO 2)  Ostial Diag=90%  Prox LPL=75%  Prox to Mid RCA=75% Left ventricular function is abnormal.  LVEF=30%  Patent Femoral Arteries  DIAGNOSTIC RECOMMENDATIONS: The patient should continue with the present  medications. Patient management should include aggressive medical therapy  and close monitoring of BUN and creatinine.  Will attempt PCI to the LAD and Diag with Impella Support  Prepared and signed by  Shahzad Acevedo MD      LABS:	 	                    14.5   12.73 )-----------( 177      ( 2021 07:55 )             44.0         139  |  102  |  35.9<H>  ----------------------------<  107<H>  4.2   |  22.0  |  1.20    Ca    9.0      2021 07:56  Mg     2.0         TPro  6.2<L>  /  Alb  3.8  /  TBili  1.2  /  DBili  x   /  AST  55<H>  /  ALT  24  /  AlkPhos  85

## 2021-07-31 NOTE — PROGRESS NOTE ADULT - SUBJECTIVE AND OBJECTIVE BOX
[facility] Division of Hospital Medicine  Miguel A Reaves [phone]    Chief Complaint:  Patient is a 88y old  Male who presents with a chief complaint of new onset Afib w RVR  NSTEMI (30 Jul 2021 11:59)      SUBJECTIVE / OVERNIGHT EVENTS:  Pt seen and examined at bedside. No acute events reported overnight. No new complaints. States he feels better.    Patient denies chest pain, SOB, abd pain, N/V, fever, chills, dysuria or any other complaints. All remainder ROS negative.     MEDICATIONS  (STANDING):  aspirin  chewable 81 milliGRAM(s) Oral daily  atorvastatin 80 milliGRAM(s) Oral at bedtime  clopidogrel Tablet 75 milliGRAM(s) Oral daily  furosemide   Injectable 40 milliGRAM(s) IV Push daily  heparin  Infusion. 1100 Unit(s)/Hr (11 mL/Hr) IV Continuous <Continuous>  metoprolol tartrate 25 milliGRAM(s) Oral every 6 hours    MEDICATIONS  (PRN):  acetaminophen   Tablet .. 650 milliGRAM(s) Oral every 6 hours PRN Temp greater or equal to 38.5C (101.3F), Mild Pain (1 - 3)  aluminum hydroxide/magnesium hydroxide/simethicone Suspension 30 milliLiter(s) Oral every 4 hours PRN Dyspepsia  heparin   Injectable 6000 Unit(s) IV Push every 6 hours PRN For aPTT less than 40  heparin   Injectable 3000 Unit(s) IV Push every 6 hours PRN For aPTT between 40 - 57  melatonin 3 milliGRAM(s) Oral at bedtime PRN Insomnia  ondansetron Injectable 4 milliGRAM(s) IV Push every 8 hours PRN Nausea and/or Vomiting        I&O's Summary    30 Jul 2021 07:01  -  31 Jul 2021 07:00  --------------------------------------------------------  IN: 204 mL / OUT: 550 mL / NET: -346 mL    31 Jul 2021 07:01  -  31 Jul 2021 10:15  --------------------------------------------------------  IN: 0 mL / OUT: 150 mL / NET: -150 mL        PHYSICAL EXAM:  Vital Signs Last 24 Hrs  T(C): 36.4 (31 Jul 2021 08:30), Max: 36.9 (30 Jul 2021 14:00)  T(F): 97.6 (31 Jul 2021 08:30), Max: 98.4 (30 Jul 2021 14:00)  HR: 72 (31 Jul 2021 08:30) (64 - 108)  BP: 104/78 (31 Jul 2021 08:30) (104/78 - 145/74)  BP(mean): 88 (30 Jul 2021 21:44) (88 - 88)  RR: 18 (31 Jul 2021 08:30) (16 - 18)  SpO2: 94% (31 Jul 2021 08:30) (93% - 97%)        CONSTITUTIONAL: NAD  HEENT: NC/AT, PERRL, no JVD  RESPIRATORY: CTA bilaterally, normal effort  CARDIOVASCULAR: RRR, S1/S2+, no m/g/r  ABDOMEN: Nontender to palpation, normoactive bowel sounds, no rebound/guarding; No hepatosplenomegaly  MUSCULOSKELETAL: No edema, cyanosis or deformities.  PSYCH: A+O to person, place, and time; affect appropriate  NEUROLOGY: CN 2-12 are intact and symmetric; no gross neurological deficits.  SKIN: No rashes; no palpable lesions  VASC: Distal pulses palpable    LABS:                        14.5   12.73 )-----------( 177      ( 31 Jul 2021 07:55 )             44.0     07-31    139  |  102  |  35.9<H>  ----------------------------<  107<H>  4.2   |  22.0  |  1.20    Ca    9.0      31 Jul 2021 07:56  Mg     2.0     07-31    TPro  6.2<L>  /  Alb  3.8  /  TBili  1.2  /  DBili  x   /  AST  55<H>  /  ALT  24  /  AlkPhos  85  07-31    PT/INR - ( 29 Jul 2021 22:56 )   PT: 12.3 sec;   INR: 1.06 ratio         PTT - ( 31 Jul 2021 01:38 )  PTT:166.5 sec  CARDIAC MARKERS ( 30 Jul 2021 08:01 )  x     / 0.49 ng/mL / x     / x     / x      CARDIAC MARKERS ( 29 Jul 2021 22:56 )  x     / 0.34 ng/mL / x     / x     / x              CAPILLARY BLOOD GLUCOSE            RADIOLOGY & ADDITIONAL TESTS:  Results Reviewed:   Imaging Personally Reviewed:  Electrocardiogram Personally Reviewed:

## 2021-08-01 LAB
ALBUMIN SERPL ELPH-MCNC: 3.3 G/DL — SIGNIFICANT CHANGE UP (ref 3.3–5.2)
ALP SERPL-CCNC: 81 U/L — SIGNIFICANT CHANGE UP (ref 40–120)
ALT FLD-CCNC: 23 U/L — SIGNIFICANT CHANGE UP
ANION GAP SERPL CALC-SCNC: 11 MMOL/L — SIGNIFICANT CHANGE UP (ref 5–17)
APTT BLD: 28 SEC — SIGNIFICANT CHANGE UP (ref 27.5–35.5)
APTT BLD: 56.4 SEC — HIGH (ref 27.5–35.5)
AST SERPL-CCNC: 39 U/L — SIGNIFICANT CHANGE UP
BILIRUB SERPL-MCNC: 0.8 MG/DL — SIGNIFICANT CHANGE UP (ref 0.4–2)
BUN SERPL-MCNC: 39.8 MG/DL — HIGH (ref 8–20)
CALCIUM SERPL-MCNC: 8.7 MG/DL — SIGNIFICANT CHANGE UP (ref 8.6–10.2)
CHLORIDE SERPL-SCNC: 104 MMOL/L — SIGNIFICANT CHANGE UP (ref 98–107)
CO2 SERPL-SCNC: 24 MMOL/L — SIGNIFICANT CHANGE UP (ref 22–29)
CREAT SERPL-MCNC: 1.28 MG/DL — SIGNIFICANT CHANGE UP (ref 0.5–1.3)
GLUCOSE SERPL-MCNC: 87 MG/DL — SIGNIFICANT CHANGE UP (ref 70–99)
HCT VFR BLD CALC: 43.7 % — SIGNIFICANT CHANGE UP (ref 39–50)
HGB BLD-MCNC: 14.2 G/DL — SIGNIFICANT CHANGE UP (ref 13–17)
MAGNESIUM SERPL-MCNC: 2 MG/DL — SIGNIFICANT CHANGE UP (ref 1.6–2.6)
MCHC RBC-ENTMCNC: 29.8 PG — SIGNIFICANT CHANGE UP (ref 27–34)
MCHC RBC-ENTMCNC: 32.5 GM/DL — SIGNIFICANT CHANGE UP (ref 32–36)
MCV RBC AUTO: 91.6 FL — SIGNIFICANT CHANGE UP (ref 80–100)
PHOSPHATE SERPL-MCNC: 3.1 MG/DL — SIGNIFICANT CHANGE UP (ref 2.4–4.7)
PLATELET # BLD AUTO: 171 K/UL — SIGNIFICANT CHANGE UP (ref 150–400)
POTASSIUM SERPL-MCNC: 4.2 MMOL/L — SIGNIFICANT CHANGE UP (ref 3.5–5.3)
POTASSIUM SERPL-SCNC: 4.2 MMOL/L — SIGNIFICANT CHANGE UP (ref 3.5–5.3)
PROT SERPL-MCNC: 5.8 G/DL — LOW (ref 6.6–8.7)
RBC # BLD: 4.77 M/UL — SIGNIFICANT CHANGE UP (ref 4.2–5.8)
RBC # FLD: 13.8 % — SIGNIFICANT CHANGE UP (ref 10.3–14.5)
SODIUM SERPL-SCNC: 139 MMOL/L — SIGNIFICANT CHANGE UP (ref 135–145)
TROPONIN T SERPL-MCNC: 1.53 NG/ML — HIGH (ref 0–0.06)
WBC # BLD: 11.86 K/UL — HIGH (ref 3.8–10.5)
WBC # FLD AUTO: 11.86 K/UL — HIGH (ref 3.8–10.5)

## 2021-08-01 PROCEDURE — 93010 ELECTROCARDIOGRAM REPORT: CPT

## 2021-08-01 PROCEDURE — 99232 SBSQ HOSP IP/OBS MODERATE 35: CPT

## 2021-08-01 RX ORDER — ENOXAPARIN SODIUM 100 MG/ML
70 INJECTION SUBCUTANEOUS EVERY 12 HOURS
Refills: 0 | Status: DISCONTINUED | OUTPATIENT
Start: 2021-08-01 | End: 2021-08-03

## 2021-08-01 RX ADMIN — ENOXAPARIN SODIUM 70 MILLIGRAM(S): 100 INJECTION SUBCUTANEOUS at 17:21

## 2021-08-01 RX ADMIN — Medication 100 MILLIGRAM(S): at 06:05

## 2021-08-01 RX ADMIN — HEPARIN SODIUM 1100 UNIT(S)/HR: 5000 INJECTION INTRAVENOUS; SUBCUTANEOUS at 07:34

## 2021-08-01 RX ADMIN — ATORVASTATIN CALCIUM 80 MILLIGRAM(S): 80 TABLET, FILM COATED ORAL at 21:32

## 2021-08-01 RX ADMIN — HEPARIN SODIUM 3000 UNIT(S): 5000 INJECTION INTRAVENOUS; SUBCUTANEOUS at 07:37

## 2021-08-01 RX ADMIN — Medication 81 MILLIGRAM(S): at 17:20

## 2021-08-01 RX ADMIN — Medication 40 MILLIGRAM(S): at 06:05

## 2021-08-01 RX ADMIN — CLOPIDOGREL BISULFATE 75 MILLIGRAM(S): 75 TABLET, FILM COATED ORAL at 17:20

## 2021-08-01 RX ADMIN — SACUBITRIL AND VALSARTAN 1 TABLET(S): 24; 26 TABLET, FILM COATED ORAL at 06:05

## 2021-08-01 NOTE — PROGRESS NOTE ADULT - ASSESSMENT
89 y/o male with new onset Afib w/ RVR, NSTEMI.     #CAD s/p LHC, New onset systolic CHF  - Cardio following  - Plan is for PCI to the LAD and Diag with Impella Support tomorrow. NPO at midnight.  - C/w ASA, Plavix, statin, BB, Entresto  - C/w Heparin gtt  - Lasix 40mg IV BID  - ECHO reviewed  - C/w telemetry  - plan per Cardiology    #AFib w/w RVR  - C/w Metoprolol  - ECHO reviewed  - Heparin gtt for elevated CHADsVASC  - Plan per Cardiology    #PAD?  - Lower extremities warm  - Arterial duplex reviewed    DVT ppx: Heparin gtt

## 2021-08-01 NOTE — PROGRESS NOTE ADULT - ASSESSMENT
plan for PCI tomorrow   npo after MN  cont asa/statin/plavix       full note to follow  89 yo M with no PMHx presents to the ER co chest pain x 3 days presented to ED in AF RVR +NSTEMI, brought to cath lab revealing TVD; pending cardiac catheterization likely impella assisted tentatively for Wednesday 8/4/21      NSTEMI (non-ST elevated myocardial infarction).  - [facility] 7/30- TVD LAD 99%, Jory 90%, LPL 75%, RCA 75%, EF 30%  -DAPT, BB, STATIN  -high risk PCI for Wed 8/4 (tentatively); likely impella assisted.     Atrial fibrillation with RVR.    -heparin gtt full anticoagulation protocol  -adjusted beta blocker to toprol 100mg daily; pt is rate controlled and tolerating (rates 70s-80s on tele).     Ischemic cardiomyopathy  -cont low dose entresto  -Strict I&O; daily weight  -Beta blocker; changed to toprol 100mg daily with parameters.   -change lasix to 40mg PO daily  87 yo M with no PMHx presents to the ER co chest pain x 3 days presented to ED in AF RVR +NSTEMI, brought to cath lab revealing TVD; pending cardiac catheterization likely impella assisted tentatively for Wednesday 8/4/21      NSTEMI (non-ST elevated myocardial infarction).  - [facility] 7/30- TVD LAD 99%, Jory 90%, LPL 75%, RCA 75%, EF 30%  -DAPT, BB, STATIN  -high risk PCI for Wed 8/4 (tentatively); likely impella assisted.   -send troponin     Atrial fibrillation with RVR.    -change heparin to lovenox; hold wednesday AM  -adjusted beta blocker to toprol 100mg daily; pt is rate controlled and tolerating (rates 70s-80s on tele).     Ischemic cardiomyopathy  -cont low dose entresto  -Strict I&O; daily weight  -Beta blocker; changed to toprol 100mg daily with parameters.   -change lasix to 40mg PO daily

## 2021-08-01 NOTE — PROGRESS NOTE ADULT - SUBJECTIVE AND OBJECTIVE BOX
[place] [facility]                                                               Office: [address] [address]                                                              Telephone: [phone]. Fax:[phone]                                                                             PROGRESS NOTE  Reason for follow up:   Overnight: No new events.   Update:     Subjective: "  ______________________"      Review of symptoms:   Cardiac:  No chest pain. No dyspnea. No palpitations.  Respiratory:no cough. No dyspnea  Gastrointestinal: No diarrhea. No abdominal pain. No bleeding.   Neuro: No focal neuro complaints.      Vitals:  T(C): 36.7 (08-01-21 @ 09:05), Max: 36.8 (07-31-21 @ 21:15)  HR: 73 (08-01-21 @ 09:05) (73 - 81)  BP: 105/75 (08-01-21 @ 09:05) (100/70 - 121/82)  RR: 16 (08-01-21 @ 09:05) (16 - 18)  SpO2: 91% (08-01-21 @ 09:05) (91% - 97%)  Wt(kg): --  I&O's Summary    31 Jul 2021 07:01  -  01 Aug 2021 07:00  --------------------------------------------------------  IN: 144 mL / OUT: 1100 mL / NET: -956 mL    01 Aug 2021 07:01  -  01 Aug 2021 09:40  --------------------------------------------------------  IN: 23 mL / OUT: 0 mL / NET: 23 mL      Weight (kg): 71 (07-29 @ 23:36)      PHYSICAL EXAM:  Appearance: Comfortable. No acute distress  HEENT:  Atraumatic. Normocephalic.  Normal oral mucosa, PERRL, Neck is supple. No carotid bruit.   Neurologic: A & O x 3, no focal deficits. EOMI.  Cardiovascular: Normal S1 S2, No murmur, rubs/gallops. No JVD, No edema  Respiratory: Lungs clear to auscultation, unlabored   Gastrointestinal:  Soft, Non-tender, + BS  Lower Extremities: No edema  Psychiatry: Patient is calm. No agitation. Mood & affect appropriate  Skin: No rashes/ ecchymoses/cyanosis/ulcers visualized on the face, hands or feet.      CURRENT MEDICATIONS:  furosemide   Injectable 40 milliGRAM(s) IV Push daily  metoprolol succinate  milliGRAM(s) Oral daily  sacubitril 24 mG/valsartan 26 mG 1 Tablet(s) Oral two times a day    atorvastatin  aspirin  chewable  clopidogrel Tablet  heparin  Infusion.      DIAGNOSTIC TESTING:  [ ] Echocardiogram:   [ ]  Catheterization:  [ ] Stress Test:    OTHER: 	      LABS:	 	  CARDIAC MARKERS ( 30 Jul 2021 08:01 )  x     / 0.49 ng/mL / x     / x     / x      p-BNP 30 Jul 2021 08:01: x    , CARDIAC MARKERS ( 29 Jul 2021 22:56 )  x     / 0.34 ng/mL / x     / x     / x      p-BNP 29 Jul 2021 22:56: 9980 pg/mL                          14.2   11.86 )-----------( 171      ( 01 Aug 2021 06:27 )             43.7     08-01    139  |  104  |  39.8<H>  ----------------------------<  87  4.2   |  24.0  |  1.28    Ca    8.7      01 Aug 2021 06:27  Phos  3.1     08-01  Mg     2.0     08-01    TPro  5.8<L>  /  Alb  3.3  /  TBili  0.8  /  DBili  x   /  AST  39  /  ALT  23  /  AlkPhos  81  08-01    proBNP: Serum Pro-Brain Natriuretic Peptide: 9980 pg/mL (07-29 @ 22:56)    Lipid Profile: Date: 07-30 @ 08:01  Total cholesterol 176; Direct LDL: --; HDL: 65; Triglycerides:50    HgA1c:   TSH: Thyroid Stimulating Hormone, Serum: 1.04 uIU/mL        TELEMETRY: Reviewed    ECG:  Reviewed by me. 	                                                                      [place] [facility]                                                               Office: [address], [place] [address]                                                              Telephone: [phone]. Fax:[phone]                                                                             PROGRESS NOTE  Reason for follow up: CAD  Update: Denies chest pain, shortness of breath. On !:1 overnight for confusion. Awake alert to person place situation.   Aware having a procedure tomorrow "i had a heart attack"  Pt with concern that has not had bowel movement      Review of symptoms:   Cardiac:  No chest pain. No dyspnea. No palpitations.  Respiratory: no cough. No dyspnea  Gastrointestinal: + constipation No diarrhea. No abdominal pain. No bleeding.   Neuro: No focal neuro complaints.      Vitals:  T(C): 36.7 (08-01-21 @ 09:05), Max: 36.8 (07-31-21 @ 21:15)  HR: 73 (08-01-21 @ 09:05) (73 - 81)  BP: 105/75 (08-01-21 @ 09:05) (100/70 - 121/82)  RR: 16 (08-01-21 @ 09:05) (16 - 18)  SpO2: 91% (08-01-21 @ 09:05) (91% - 97%)      I&O's Summary    31 Jul 2021 07:01  -  01 Aug 2021 07:00  --------------------------------------------------------  IN: 144 mL / OUT: 1100 mL / NET: -956 mL    01 Aug 2021 07:01  -  01 Aug 2021 09:40  --------------------------------------------------------  IN: 23 mL / OUT: 0 mL / NET: 23 mL      Weight (kg): 71 (07-29 @ 23:36)      PHYSICAL EXAM:  Appearance: Comfortable. No acute distress  HEENT:  Atraumatic. Normocephalic.  Normal oral mucosa  Neurologic: A & O x 3, no focal deficits. EOMI.  Cardiovascular: Normal S1 S2, No murmur, rubs/gallops.  Respiratory: Lungs clear to auscultation, unlabored   Gastrointestinal:  Soft, Non-tender, + BS  Lower Extremities: No edema  Psychiatry: Patient is calm. No agitation.   Skin: No rashes/ ecchymoses/cyanosis/ulcers visualized on the face, hands or feet.      CURRENT MEDICATIONS:  furosemide Injectable 40 milliGRAM(s) IV Push daily  metoprolol succinate  milliGRAM(s) Oral daily  sacubitril 24 mG/valsartan 26 mG 1 Tablet(s) Oral two times a day    atorvastatin  aspirin  chewable  clopidogrel Tablet  heparin  Infusion.      DIAGNOSTIC TESTING:  [ ] Echocardiogram:   < from: TTE Echo Complete w/o Contrast w/ Doppler (07.30.21 @ 08:09) >    Summary:   1. Endocardial visualization was enhanced with intravenous echo contrast.   2. Left ventricular ejection fraction, by visual estimation, is 20 to 25%.   3. Severely decreased global left ventricular systolic function.   4. Multiple left ventricular regional wall motion abnormalities exist. See wall motion findings.   5. Severely enlarged left atrium.   6. Severely enlarged right atrium.   7. Mild mitral annular calcification.   8. Mild mitral valve regurgitation.   9. Moderate tricuspid regurgitation.  10. Peak transaortic gradient equals 14.0 mmHg, mean transaortic gradient equals 8.6 mmHg, the calculated aortic valve area equals 0.59 cm² by the continuity equation consistent with severe aortic stenosis. The Dimensionless Index value is 0.23, also consistent with severe AS.  11. Estimated pulmonary artery systolic pressure is 49.5 mmHg assuming a right atrial pressure of 8 mmHg, which is consistent with mild pulmonary hypertension.  12. Trivial pericardial effusion.  13. No prior studies are available for comparison.    LICHA Blum Electronically signed on 7/30/2021 at 12:37:52 PM    < end of copied text >      LABS:	 	  CARDIAC MARKERS ( 30 Jul 2021 08:01 )  x     / 0.49 ng/mL / x     / x     / x      p-BNP 30 Jul 2021 08:01: x    , CARDIAC MARKERS ( 29 Jul 2021 22:56 )  x     / 0.34 ng/mL / x     / x     / x      p-BNP 29 Jul 2021 22:56: 9980 pg/mL                          14.2   11.86 )-----------( 171      ( 01 Aug 2021 06:27 )             43.7     08-01    139  |  104  |  39.8<H>  ----------------------------<  87  4.2   |  24.0  |  1.28    Ca    8.7      01 Aug 2021 06:27  Phos  3.1     08-01  Mg     2.0     08-01    TPro  5.8<L>  /  Alb  3.3  /  TBili  0.8  /  DBili  x   /  AST  39  /  ALT  23  /  AlkPhos  81  08-01    proBNP: Serum Pro-Brain Natriuretic Peptide: 9980 pg/mL (07-29 @ 22:56)    Lipid Profile: Date: 07-30 @ 08:01  Total cholesterol 176; Direct LDL: --; HDL: 65; Triglycerides:50    TSH: Thyroid Stimulating Hormone, Serum: 1.04 uIU/mL    TELEMETRY: Afib rate controlled, 70-80  ECG:  Reviewed by me.

## 2021-08-01 NOTE — PROGRESS NOTE ADULT - ATTENDING COMMENTS
pt seen and examined  doing well and no cp or sob  labs reviewed.  DC heparin. getting too many sticks   start lovenox tonight.  Afib, rate controlled   no pauses on tele  plan for cath and pci on Wednesday.

## 2021-08-01 NOTE — PROGRESS NOTE ADULT - SUBJECTIVE AND OBJECTIVE BOX
[facility] Division of Hospital Medicine  Miguel A Reaves [phone]    Chief Complaint:  Patient is a 88y old  Male who presents with a chief complaint of new onset Afib w RVR  NSTEMI (01 Aug 2021 09:40)      SUBJECTIVE / OVERNIGHT EVENTS:  Pt seen and examined at bedside. No complaints.    Patient denies chest pain, SOB, abd pain, N/V, fever, chills, dysuria or any other complaints. All remainder ROS negative.     MEDICATIONS  (STANDING):  aspirin  chewable 81 milliGRAM(s) Oral daily  atorvastatin 80 milliGRAM(s) Oral at bedtime  clopidogrel Tablet 75 milliGRAM(s) Oral daily  furosemide   Injectable 40 milliGRAM(s) IV Push daily  heparin  Infusion. 1100 Unit(s)/Hr (11 mL/Hr) IV Continuous <Continuous>  metoprolol succinate  milliGRAM(s) Oral daily  sacubitril 24 mG/valsartan 26 mG 1 Tablet(s) Oral two times a day    MEDICATIONS  (PRN):  acetaminophen   Tablet .. 650 milliGRAM(s) Oral every 6 hours PRN Temp greater or equal to 38.5C (101.3F), Mild Pain (1 - 3)  aluminum hydroxide/magnesium hydroxide/simethicone Suspension 30 milliLiter(s) Oral every 4 hours PRN Dyspepsia  heparin   Injectable 6000 Unit(s) IV Push every 6 hours PRN For aPTT less than 40  heparin   Injectable 3000 Unit(s) IV Push every 6 hours PRN For aPTT between 40 - 57  melatonin 3 milliGRAM(s) Oral at bedtime PRN Insomnia  ondansetron Injectable 4 milliGRAM(s) IV Push every 8 hours PRN Nausea and/or Vomiting        I&O's Summary    31 Jul 2021 07:01  -  01 Aug 2021 07:00  --------------------------------------------------------  IN: 144 mL / OUT: 1100 mL / NET: -956 mL    01 Aug 2021 07:01  -  01 Aug 2021 10:10  --------------------------------------------------------  IN: 34 mL / OUT: 0 mL / NET: 34 mL        PHYSICAL EXAM:  Vital Signs Last 24 Hrs  T(C): 36.7 (01 Aug 2021 09:05), Max: 36.8 (31 Jul 2021 21:15)  T(F): 98.1 (01 Aug 2021 09:05), Max: 98.3 (31 Jul 2021 21:15)  HR: 73 (01 Aug 2021 09:05) (73 - 81)  BP: 105/75 (01 Aug 2021 09:05) (100/70 - 121/82)  BP(mean): --  RR: 16 (01 Aug 2021 09:05) (16 - 18)  SpO2: 91% (01 Aug 2021 09:05) (91% - 97%)        CONSTITUTIONAL: NAD  HEENT: NC/AT, PERRL, no JVD  RESPIRATORY: CTA bilaterally, normal effort  CARDIOVASCULAR: RRR, S1/S2+, no m/g/r  ABDOMEN: Nontender to palpation, normoactive bowel sounds, no rebound/guarding; No hepatosplenomegaly  MUSCULOSKELETAL: No edema, cyanosis or deformities.  PSYCH: A+O to person, place, and time; affect appropriate  NEUROLOGY: CN 2-12 are intact and symmetric; no gross neurological deficits.  SKIN: No rashes; no palpable lesions  VASC: Distal pulses palpable    LABS:                        14.2   11.86 )-----------( 171      ( 01 Aug 2021 06:27 )             43.7     08-01    139  |  104  |  39.8<H>  ----------------------------<  87  4.2   |  24.0  |  1.28    Ca    8.7      01 Aug 2021 06:27  Phos  3.1     08-01  Mg     2.0     08-01    TPro  5.8<L>  /  Alb  3.3  /  TBili  0.8  /  DBili  x   /  AST  39  /  ALT  23  /  AlkPhos  81  08-01    PTT - ( 01 Aug 2021 06:27 )  PTT:56.4 sec          CAPILLARY BLOOD GLUCOSE            RADIOLOGY & ADDITIONAL TESTS:  Results Reviewed:   Imaging Personally Reviewed:  Electrocardiogram Personally Reviewed:

## 2021-08-02 ENCOUNTER — TRANSCRIPTION ENCOUNTER (OUTPATIENT)
Age: 86
End: 2021-08-02

## 2021-08-02 PROBLEM — Z78.9 OTHER SPECIFIED HEALTH STATUS: Chronic | Status: ACTIVE | Noted: 2021-07-30

## 2021-08-02 LAB
ALBUMIN SERPL ELPH-MCNC: 3.2 G/DL — LOW (ref 3.3–5.2)
ALP SERPL-CCNC: 88 U/L — SIGNIFICANT CHANGE UP (ref 40–120)
ALT FLD-CCNC: 26 U/L — SIGNIFICANT CHANGE UP
ANION GAP SERPL CALC-SCNC: 10 MMOL/L — SIGNIFICANT CHANGE UP (ref 5–17)
AST SERPL-CCNC: 30 U/L — SIGNIFICANT CHANGE UP
BILIRUB SERPL-MCNC: 0.7 MG/DL — SIGNIFICANT CHANGE UP (ref 0.4–2)
BUN SERPL-MCNC: 29.2 MG/DL — HIGH (ref 8–20)
CALCIUM SERPL-MCNC: 8.7 MG/DL — SIGNIFICANT CHANGE UP (ref 8.6–10.2)
CHLORIDE SERPL-SCNC: 103 MMOL/L — SIGNIFICANT CHANGE UP (ref 98–107)
CO2 SERPL-SCNC: 28 MMOL/L — SIGNIFICANT CHANGE UP (ref 22–29)
CREAT SERPL-MCNC: 1.28 MG/DL — SIGNIFICANT CHANGE UP (ref 0.5–1.3)
GLUCOSE SERPL-MCNC: 90 MG/DL — SIGNIFICANT CHANGE UP (ref 70–99)
HCT VFR BLD CALC: 46.9 % — SIGNIFICANT CHANGE UP (ref 39–50)
HGB BLD-MCNC: 15.5 G/DL — SIGNIFICANT CHANGE UP (ref 13–17)
MAGNESIUM SERPL-MCNC: 2 MG/DL — SIGNIFICANT CHANGE UP (ref 1.6–2.6)
MCHC RBC-ENTMCNC: 29.8 PG — SIGNIFICANT CHANGE UP (ref 27–34)
MCHC RBC-ENTMCNC: 33 GM/DL — SIGNIFICANT CHANGE UP (ref 32–36)
MCV RBC AUTO: 90.2 FL — SIGNIFICANT CHANGE UP (ref 80–100)
PHOSPHATE SERPL-MCNC: 3.3 MG/DL — SIGNIFICANT CHANGE UP (ref 2.4–4.7)
PLATELET # BLD AUTO: 192 K/UL — SIGNIFICANT CHANGE UP (ref 150–400)
POTASSIUM SERPL-MCNC: 3.9 MMOL/L — SIGNIFICANT CHANGE UP (ref 3.5–5.3)
POTASSIUM SERPL-SCNC: 3.9 MMOL/L — SIGNIFICANT CHANGE UP (ref 3.5–5.3)
PROT SERPL-MCNC: 5.7 G/DL — LOW (ref 6.6–8.7)
RBC # BLD: 5.2 M/UL — SIGNIFICANT CHANGE UP (ref 4.2–5.8)
RBC # FLD: 14 % — SIGNIFICANT CHANGE UP (ref 10.3–14.5)
SODIUM SERPL-SCNC: 141 MMOL/L — SIGNIFICANT CHANGE UP (ref 135–145)
TROPONIN T SERPL-MCNC: 1.87 NG/ML — HIGH (ref 0–0.06)
WBC # BLD: 10.39 K/UL — SIGNIFICANT CHANGE UP (ref 3.8–10.5)
WBC # FLD AUTO: 10.39 K/UL — SIGNIFICANT CHANGE UP (ref 3.8–10.5)

## 2021-08-02 PROCEDURE — 99233 SBSQ HOSP IP/OBS HIGH 50: CPT

## 2021-08-02 PROCEDURE — 99232 SBSQ HOSP IP/OBS MODERATE 35: CPT

## 2021-08-02 RX ORDER — POLYETHYLENE GLYCOL 3350 17 G/17G
17 POWDER, FOR SOLUTION ORAL DAILY
Refills: 0 | Status: DISCONTINUED | OUTPATIENT
Start: 2021-08-02 | End: 2021-08-03

## 2021-08-02 RX ORDER — FUROSEMIDE 40 MG
40 TABLET ORAL DAILY
Refills: 0 | Status: DISCONTINUED | OUTPATIENT
Start: 2021-08-02 | End: 2021-08-06

## 2021-08-02 RX ORDER — SENNA PLUS 8.6 MG/1
2 TABLET ORAL AT BEDTIME
Refills: 0 | Status: DISCONTINUED | OUTPATIENT
Start: 2021-08-02 | End: 2021-08-03

## 2021-08-02 RX ADMIN — ENOXAPARIN SODIUM 70 MILLIGRAM(S): 100 INJECTION SUBCUTANEOUS at 09:35

## 2021-08-02 RX ADMIN — ENOXAPARIN SODIUM 70 MILLIGRAM(S): 100 INJECTION SUBCUTANEOUS at 21:31

## 2021-08-02 RX ADMIN — CLOPIDOGREL BISULFATE 75 MILLIGRAM(S): 75 TABLET, FILM COATED ORAL at 09:35

## 2021-08-02 RX ADMIN — POLYETHYLENE GLYCOL 3350 17 GRAM(S): 17 POWDER, FOR SOLUTION ORAL at 05:59

## 2021-08-02 RX ADMIN — ATORVASTATIN CALCIUM 80 MILLIGRAM(S): 80 TABLET, FILM COATED ORAL at 21:31

## 2021-08-02 RX ADMIN — Medication 81 MILLIGRAM(S): at 09:35

## 2021-08-02 NOTE — DISCHARGE NOTE NURSING/CASE MANAGEMENT/SOCIAL WORK - NSDCFUADDAPPT_GEN_ALL_CORE_FT
[facility]:    1. CARDIO APPOITNMENT    / Marleni VERA  On MONDAY August 16, 2021 at 330pm  [address], [place] [address]  Tel [phone]     2. Pt agreeable to MEDS-TO-BED with [facility]

## 2021-08-02 NOTE — PROGRESS NOTE ADULT - ATTENDING COMMENTS
-Troponin still uptrending slightly but denies any chest pain, continue ASA/clopidogrel/statin, switched heparin gtt to LMWH for ease of dosing/monitoring, hold last dose on Tuesday night for Impella assisted PCI of LAD  -Afib rate controlled on metoprolol, continue AC- plan for triple therapy x1 month  -appear euvolemic, switch IV Lasix to PO 40mg daily  -continue GDMT for ICM/HFrEF, if SBP <100 then hold Entresto, eventual addition of spironolactone if K/Cr. stable  -bilateral feet cold/cyanosis due to Raynaud vs. acrocyanosis, no evidence of significant PAD on limited R-leg arterial Duplex        Jose De Jesus Beth DO, [facility]  Faculty Non-Invasive Cardiologist  [phone]

## 2021-08-02 NOTE — DISCHARGE NOTE NURSING/CASE MANAGEMENT/SOCIAL WORK - PATIENT PORTAL LINK FT
You can access the FollowMyHealth Patient Portal offered by [facility] by registering at the following website: [email or web address]. By joining [employer]’s FollowMyHealth portal, you will also be able to view your health information using other applications (apps) compatible with our system.

## 2021-08-02 NOTE — PROGRESS NOTE ADULT - SUBJECTIVE AND OBJECTIVE BOX
SUBJECTIVE:  Cardiology NP F/U note:  NSTEMI/ LHC/ TVD/ HF  denies complaints of chest pain/sob/dizziness/palps  comfortable, chelle diet OOB     	  MEDICATIONS  (STANDING):  aspirin  chewable 81 milliGRAM(s) Oral daily  atorvastatin 80 milliGRAM(s) Oral at bedtime  clopidogrel Tablet 75 milliGRAM(s) Oral daily  enoxaparin Injectable 70 milliGRAM(s) SubCutaneous every 12 hours  furosemide   Injectable 40 milliGRAM(s) IV Push daily  metoprolol succinate  milliGRAM(s) Oral daily  sacubitril 24 mG/valsartan 26 mG 1 Tablet(s) Oral two times a day    MEDICATIONS  (PRN):  acetaminophen   Tablet .. 650 milliGRAM(s) Oral every 6 hours PRN Temp greater or equal to 38.5C (101.3F), Mild Pain (1 - 3)  aluminum hydroxide/magnesium hydroxide/simethicone Suspension 30 milliLiter(s) Oral every 4 hours PRN Dyspepsia  melatonin 3 milliGRAM(s) Oral at bedtime PRN Insomnia  ondansetron Injectable 4 milliGRAM(s) IV Push every 8 hours PRN Nausea and/or Vomiting  polyethylene glycol 3350 17 Gram(s) Oral daily PRN Constipation  senna 2 Tablet(s) Oral at bedtime PRN Constipation      PHYSICAL EXAM:    T(C): 36.5 (21 @ 05:57), Max: 36.8 (21 @ 21:30)  HR: 63 (21 @ 05:57) (63 - 81)  BP: 89/55 (21 @ 05:57) (89/55 - 115/80)  RR: 17 (21 @ 05:57) (16 - 17)  SpO2: 94% (21 @ 05:57) (94% - 96%)  Wt(kg): --    I&O's Summary    01 Aug 2021 07:01  -  02 Aug 2021 07:00  --------------------------------------------------------  IN: 34 mL / OUT: 275 mL / NET: -241 mL        Daily     Daily Weight in k.7 (02 Aug 2021 04:37)    Appearance: NAD	  HEENT:   Normal oral mucosa,   Lymphatic: No lymphadenopathy  Cardiovascular: Normal S1 S2 IRREG 70's , No JVD, 2/6 AIDEN LSB to the neck/, No edema  Respiratory: Lungs clear to auscultation	  Psychiatry: A & O x 3, Mood & affect appropriate  Gastrointestinal:  Soft, Non-tender, + BS	  Skin: warm and dry  Neurologic: Non-focal  Extremities: Normal range of motion,:  Vascular: Peripheral pulses palpable 2+ bilaterally    TELEMETRY: 	AF 70's    	  RADIOLOGY:   DIAGNOSTIC TESTING:  [ ] Echocardiogram:    < from: TTE Echo Complete w/o Contrast w/ Doppler (21 @ 08:09) >  Endocardial visualization was enhanced with intravenous echo contrast.   2. Left ventricular ejection fraction, by visual estimation, is 20 to 25%.   3. Severely decreased global left ventricular systolic function.   4. Multiple left ventricular regional wall motion abnormalities exist. See wall motion findings.   5. Severely enlarged left atrium.   6. Severely enlarged right atrium.   7. Mild mitral annular calcification.   8. Mild mitral valve regurgitation.   9. Moderate tricuspid regurgitation.  10. Peak transaortic gradient equals 14.0 mmHg, mean transaortic gradient equals 8.6 mmHg, the calculated aortic valve area equals 0.59 cm² by the continuity equation consistent with severe aortic stenosis. The Dimensionless Index value is 0.23, also consistent with severe AS.  11. Estimated pulmonary artery systolic pressure is 49.5 mmHg assuming a right atrial pressure of 8 mmHg, which is consistent with mild pulmonary hypertension.  12. Trivial pericardial effusion.  13. No prior studies are available for comparison.    [ ]  Catheterization:  < from: Cardiac Cath Lab - Adult (21 @ 10:28) >  DIAGNOSTIC IMPRESSIONS: There is significant triple vessel coronary artery  disease.  Prox LAD=99% (REYNALDO 2)  Ostial Diag=90%  Prox LPL=75%  Prox to Mid RCA=75% Left ventricular function is abnormal.  LVEF=30%  Patent Femoral Arteries  DIAGNOSTIC RECOMMENDATIONS: The patient should continue with the present  medications. Patient management should include aggressive medical therapy  and close monitoring of BUN and creatinine.  Will attempt PCI to the LAD and Diag with Impella Support  Prepared and signed by  Shahzad Acevedo MD   	    CARDIAC MARKERS: Positive                                   15.5   10.39 )-----------( 192      ( 02 Aug 2021 06:37 )             46.9     08-02    141  |  103  |  29.2<H>  ----------------------------<  90  3.9   |  28.0  |  1.28    Ca    8.7      02 Aug 2021 06:37  Phos  3.3     08-02  Mg     2.0     08-02    TPro  5.7<L>  /  Alb  3.2<L>  /  TBili  0.7  /  DBili  x   /  AST  30  /  ALT  26  /  AlkPhos  88  08    ASSESSMENT:  89 yo M with no PMHx presents to the ER co chest pain x 3 days presented to ED in AF RVR +NSTEMI, brought to cath lab revealing TVD; pending cardiac catheterization likely impella assisted tentatively for 21    NSTEMI (non-ST elevated myocardial infarction).  - C - TVD LAD 99%, Jory 90%, LPL 75%, RCA 75%, EF 30%  -DAPT, BB, STATIN  -hemodynamically stable without chest pain   -high risk PCI for  (tentatively); likely impella assisted.     Atrial fibrillation with RVR. -rate conrtrolled and AC with lovenox      Ischemic cardiomyopathy- acute on chronic systolic HF EF 20-25%  -euvolemic  Plan:  continue current meds and management ASA/ plavix/ high dose statin/ BB/ Entresto/ Lasix changed to PO/full dose lovenox   for [facility] with Impella support planned for 21   cardiac rehab info provided/referral and communication to cardiac rehab completed

## 2021-08-02 NOTE — PROGRESS NOTE ADULT - SUBJECTIVE AND OBJECTIVE BOX
[address] [place] [address] Division of Hospital Medicine  Miguel A Reaves [phone]    Chief Complaint:  Patient is a 88y old  Male who presents with a chief complaint of new onset Afib w RVR  NSTEMI (02 Aug 2021 09:15)      SUBJECTIVE / OVERNIGHT EVENTS:  Pt seen and examined at bedside. No acute events reported overnight. No new complaints.    Patient denies chest pain, SOB, abd pain, N/V, fever, chills, dysuria or any other complaints. All remainder ROS negative.     MEDICATIONS  (STANDING):  aspirin  chewable 81 milliGRAM(s) Oral daily  atorvastatin 80 milliGRAM(s) Oral at bedtime  clopidogrel Tablet 75 milliGRAM(s) Oral daily  enoxaparin Injectable 70 milliGRAM(s) SubCutaneous every 12 hours  furosemide   Injectable 40 milliGRAM(s) IV Push daily  metoprolol succinate  milliGRAM(s) Oral daily  sacubitril 24 mG/valsartan 26 mG 1 Tablet(s) Oral two times a day    MEDICATIONS  (PRN):  acetaminophen   Tablet .. 650 milliGRAM(s) Oral every 6 hours PRN Temp greater or equal to 38.5C (101.3F), Mild Pain (1 - 3)  aluminum hydroxide/magnesium hydroxide/simethicone Suspension 30 milliLiter(s) Oral every 4 hours PRN Dyspepsia  melatonin 3 milliGRAM(s) Oral at bedtime PRN Insomnia  ondansetron Injectable 4 milliGRAM(s) IV Push every 8 hours PRN Nausea and/or Vomiting  polyethylene glycol 3350 17 Gram(s) Oral daily PRN Constipation  senna 2 Tablet(s) Oral at bedtime PRN Constipation        I&O's Summary    01 Aug 2021 07:01  -  02 Aug 2021 07:00  --------------------------------------------------------  IN: 34 mL / OUT: 275 mL / NET: -241 mL        PHYSICAL EXAM:  Vital Signs Last 24 Hrs  T(C): 36.7 (02 Aug 2021 09:30), Max: 36.8 (01 Aug 2021 21:30)  T(F): 98 (02 Aug 2021 09:30), Max: 98.2 (01 Aug 2021 21:30)  HR: 80 (02 Aug 2021 09:30) (63 - 81)  BP: 97/64 (02 Aug 2021 09:30) (89/55 - 115/80)  BP(mean): --  RR: 18 (02 Aug 2021 09:30) (16 - 18)  SpO2: 95% (02 Aug 2021 09:30) (94% - 96%)        CONSTITUTIONAL: NAD  HEENT: NC/AT, PERRL, no JVD  RESPIRATORY: CTA bilaterally, normal effort  CARDIOVASCULAR: RRR, S1/S2+, no m/g/r  ABDOMEN: Nontender to palpation, normoactive bowel sounds, no rebound/guarding; No hepatosplenomegaly  MUSCULOSKELETAL: No edema, cyanosis or deformities.  PSYCH: A+O to person, place, and time; affect appropriate  NEUROLOGY: CN 2-12 are intact and symmetric; no gross neurological deficits.  SKIN: No rashes; no palpable lesions  VASC: Distal pulses palpable    LABS:                        15.5   10.39 )-----------( 192      ( 02 Aug 2021 06:37 )             46.9     08-02    141  |  103  |  29.2<H>  ----------------------------<  90  3.9   |  28.0  |  1.28    Ca    8.7      02 Aug 2021 06:37  Phos  3.3     08-02  Mg     2.0     08-02    TPro  5.7<L>  /  Alb  3.2<L>  /  TBili  0.7  /  DBili  x   /  AST  30  /  ALT  26  /  AlkPhos  88  08-02    PTT - ( 01 Aug 2021 15:52 )  PTT:28.0 sec  CARDIAC MARKERS ( 02 Aug 2021 06:37 )  x     / 1.87 ng/mL / x     / x     / x      CARDIAC MARKERS ( 01 Aug 2021 12:07 )  x     / 1.53 ng/mL / x     / x     / x              CAPILLARY BLOOD GLUCOSE            RADIOLOGY & ADDITIONAL TESTS:  Results Reviewed:   Imaging Personally Reviewed:  Electrocardiogram Personally Reviewed:

## 2021-08-02 NOTE — PROGRESS NOTE ADULT - ASSESSMENT
89 y/o male with new onset Afib w/ RVR, NSTEMI.     #CAD s/p LHC, New onset systolic CHF  - Cardio following  - Plan is for PCI to the LAD and Diag with Impella Support on Wednesday  - C/w ASA, Plavix, statin, BB, Entresto  - Heparin gtt switched to Lovenox  - Lasix 40mg IV BID  - ECHO reviewed  - C/w telemetry  - plan per Cardiology    #AFib w/w RVR  - C/w Metoprolol  - ECHO reviewed  - Lovenox for elevated CHADsVASC  - Plan per Cardiology    #PAD?  - Lower extremities warm  - Arterial duplex reviewed    DVT ppx: Lovenox

## 2021-08-03 LAB
HCT VFR BLD CALC: 46.9 % — SIGNIFICANT CHANGE UP (ref 39–50)
HGB BLD-MCNC: 15.4 G/DL — SIGNIFICANT CHANGE UP (ref 13–17)
MCHC RBC-ENTMCNC: 30 PG — SIGNIFICANT CHANGE UP (ref 27–34)
MCHC RBC-ENTMCNC: 32.8 GM/DL — SIGNIFICANT CHANGE UP (ref 32–36)
MCV RBC AUTO: 91.4 FL — SIGNIFICANT CHANGE UP (ref 80–100)
PLATELET # BLD AUTO: 182 K/UL — SIGNIFICANT CHANGE UP (ref 150–400)
RBC # BLD: 5.13 M/UL — SIGNIFICANT CHANGE UP (ref 4.2–5.8)
RBC # FLD: 14.1 % — SIGNIFICANT CHANGE UP (ref 10.3–14.5)
WBC # BLD: 10.1 K/UL — SIGNIFICANT CHANGE UP (ref 3.8–10.5)
WBC # FLD AUTO: 10.1 K/UL — SIGNIFICANT CHANGE UP (ref 3.8–10.5)

## 2021-08-03 PROCEDURE — 99232 SBSQ HOSP IP/OBS MODERATE 35: CPT

## 2021-08-03 PROCEDURE — 99233 SBSQ HOSP IP/OBS HIGH 50: CPT

## 2021-08-03 RX ORDER — POLYETHYLENE GLYCOL 3350 17 G/17G
17 POWDER, FOR SOLUTION ORAL DAILY
Refills: 0 | Status: DISCONTINUED | OUTPATIENT
Start: 2021-08-03 | End: 2021-08-06

## 2021-08-03 RX ORDER — SENNA PLUS 8.6 MG/1
2 TABLET ORAL AT BEDTIME
Refills: 0 | Status: DISCONTINUED | OUTPATIENT
Start: 2021-08-03 | End: 2021-08-06

## 2021-08-03 RX ORDER — MINERAL OIL
133 OIL (ML) MISCELLANEOUS ONCE
Refills: 0 | Status: COMPLETED | OUTPATIENT
Start: 2021-08-03 | End: 2021-08-03

## 2021-08-03 RX ORDER — ENOXAPARIN SODIUM 100 MG/ML
70 INJECTION SUBCUTANEOUS ONCE
Refills: 0 | Status: COMPLETED | OUTPATIENT
Start: 2021-08-03 | End: 2021-08-03

## 2021-08-03 RX ADMIN — Medication 40 MILLIGRAM(S): at 05:10

## 2021-08-03 RX ADMIN — ENOXAPARIN SODIUM 70 MILLIGRAM(S): 100 INJECTION SUBCUTANEOUS at 11:18

## 2021-08-03 RX ADMIN — SENNA PLUS 2 TABLET(S): 8.6 TABLET ORAL at 22:17

## 2021-08-03 RX ADMIN — POLYETHYLENE GLYCOL 3350 17 GRAM(S): 17 POWDER, FOR SOLUTION ORAL at 11:18

## 2021-08-03 RX ADMIN — Medication 81 MILLIGRAM(S): at 11:18

## 2021-08-03 RX ADMIN — Medication 100 MILLIGRAM(S): at 05:10

## 2021-08-03 RX ADMIN — SENNA PLUS 2 TABLET(S): 8.6 TABLET ORAL at 05:11

## 2021-08-03 RX ADMIN — ATORVASTATIN CALCIUM 80 MILLIGRAM(S): 80 TABLET, FILM COATED ORAL at 22:16

## 2021-08-03 RX ADMIN — CLOPIDOGREL BISULFATE 75 MILLIGRAM(S): 75 TABLET, FILM COATED ORAL at 11:18

## 2021-08-03 RX ADMIN — SACUBITRIL AND VALSARTAN 1 TABLET(S): 24; 26 TABLET, FILM COATED ORAL at 13:40

## 2021-08-03 RX ADMIN — Medication 133 MILLILITER(S): at 14:06

## 2021-08-03 RX ADMIN — ENOXAPARIN SODIUM 70 MILLIGRAM(S): 100 INJECTION SUBCUTANEOUS at 22:17

## 2021-08-03 NOTE — PROGRESS NOTE ADULT - ASSESSMENT
89 y/o male with new onset Afib w/ RVR, NSTEMI.     #CAD s/p LHC, New onset systolic CHF  - Cardio following  - C/w ASA, Plavix, statin, BB, Entresto  - Heparin gtt switched to Lovenox  - Lasix 40mg IV BID switched to Lasix 40mg PO qd  - ECHO reviewed  - C/w telemetry  - Plan is for PCI to the LAD and Diag with Impella Support on Wednesday    #AFib w/w RVR  - C/w Metoprolol  - ECHO reviewed  - Lovenox for elevated CHADsVASC  - Plan per Cardiology    #PAD?  - Lower extremities warm  - Arterial duplex reviewed    DVT ppx: Lovenox

## 2021-08-03 NOTE — PHARMACOTHERAPY INTERVENTION NOTE - COMMENTS
Spoke to patient who endorses he does not take any medications (including OTC's) at home at this time

## 2021-08-03 NOTE — PROGRESS NOTE ADULT - ASSESSMENT
88M no past medical care presents with chest pain x 3 days found with newly dx Afib RVR and+NSTEMI, acute HFrEF 25%, cath found CAD with MVD; pending impella assisted PCI of LAD/diagonal branch tentatively for Wednesday 8/4/21      NSTEMI (non-ST elevated myocardial infarction).  - [facility] 7/30- TVD LAD 99%, Jory 90%, LPL 75%, RCA 75%, EF 30%  -DAPT with ASA/clopidogrel, BB, STATIN  -high risk PCI for Wed 8/4 (tentatively); likely impella assisted with Dr. Acevedo      Atrial fibrillation with RVR.    -change heparin to lovenox; hold Wednesday AM dose  -continue metoprolol succinate 100mg, consider low dose digoxin if BP not tolerate uptitration of metoprolol    Ischemic cardiomyopathy, Acute HFrEF  -hold Entresto dose tonight/tomorrow pending PCI  -continue PO Lasix to 40mg PO daily to keep euvolemic       Discussed and updated his daughter (Lynn) over the phone.     Jose De Jesus Beth DO, [facility]  Faculty Non-Invasive Cardiologist  [phone]

## 2021-08-03 NOTE — PROGRESS NOTE ADULT - SUBJECTIVE AND OBJECTIVE BOX
[facility] Division of Hospital Medicine  Miguel A Reaves [phone]    Chief Complaint:  Patient is a 88y old  Male who presents with a chief complaint of new onset Afib w RVR  NSTEMI (02 Aug 2021 10:30)      SUBJECTIVE / OVERNIGHT EVENTS:  Pt seen and examined at bedside. No acute events overnight. No complaints.    Patient denies chest pain, SOB, abd pain, N/V, fever, chills, dysuria or any other complaints. All remainder ROS negative.     MEDICATIONS  (STANDING):  aspirin  chewable 81 milliGRAM(s) Oral daily  atorvastatin 80 milliGRAM(s) Oral at bedtime  clopidogrel Tablet 75 milliGRAM(s) Oral daily  enoxaparin Injectable 70 milliGRAM(s) SubCutaneous every 12 hours  furosemide    Tablet 40 milliGRAM(s) Oral daily  metoprolol succinate  milliGRAM(s) Oral daily  sacubitril 24 mG/valsartan 26 mG 1 Tablet(s) Oral two times a day    MEDICATIONS  (PRN):  acetaminophen   Tablet .. 650 milliGRAM(s) Oral every 6 hours PRN Temp greater or equal to 38.5C (101.3F), Mild Pain (1 - 3)  aluminum hydroxide/magnesium hydroxide/simethicone Suspension 30 milliLiter(s) Oral every 4 hours PRN Dyspepsia  melatonin 3 milliGRAM(s) Oral at bedtime PRN Insomnia  ondansetron Injectable 4 milliGRAM(s) IV Push every 8 hours PRN Nausea and/or Vomiting  polyethylene glycol 3350 17 Gram(s) Oral daily PRN Constipation  senna 2 Tablet(s) Oral at bedtime PRN Constipation        I&O's Summary    02 Aug 2021 07:01  -  03 Aug 2021 07:00  --------------------------------------------------------  IN: 240 mL / OUT: 475 mL / NET: -235 mL        PHYSICAL EXAM:  Vital Signs Last 24 Hrs  T(C): 36.7 (03 Aug 2021 08:20), Max: 36.8 (02 Aug 2021 20:17)  T(F): 98.1 (03 Aug 2021 08:20), Max: 98.3 (02 Aug 2021 20:17)  HR: 87 (03 Aug 2021 08:20) (84 - 88)  BP: 101/74 (03 Aug 2021 08:20) (97/57 - 122/79)  BP(mean): --  RR: 17 (03 Aug 2021 08:20) (17 - 17)  SpO2: 96% (03 Aug 2021 08:20) (96% - 96%)        CONSTITUTIONAL: NAD  HEENT: NC/AT, PERRL, no JVD  RESPIRATORY: CTA bilaterally, normal effort  CARDIOVASCULAR: RRR, S1/S2+, no m/g/r  ABDOMEN: Nontender to palpation, normoactive bowel sounds, no rebound/guarding; No hepatosplenomegaly  MUSCULOSKELETAL: No edema, cyanosis or deformities.  PSYCH: A+O to person, place, and time; affect appropriate  NEUROLOGY: CN 2-12 are intact and symmetric; no gross neurological deficits.  SKIN: No rashes; no palpable lesions  VASC: Distal pulses palpable    LABS:                        15.4   10.10 )-----------( 182      ( 03 Aug 2021 05:51 )             46.9     08-02    141  |  103  |  29.2<H>  ----------------------------<  90  3.9   |  28.0  |  1.28    Ca    8.7      02 Aug 2021 06:37  Phos  3.3     08-02  Mg     2.0     08-02    TPro  5.7<L>  /  Alb  3.2<L>  /  TBili  0.7  /  DBili  x   /  AST  30  /  ALT  26  /  AlkPhos  88  08-02    PTT - ( 01 Aug 2021 15:52 )  PTT:28.0 sec  CARDIAC MARKERS ( 02 Aug 2021 06:37 )  x     / 1.87 ng/mL / x     / x     / x      CARDIAC MARKERS ( 01 Aug 2021 12:07 )  x     / 1.53 ng/mL / x     / x     / x              CAPILLARY BLOOD GLUCOSE            RADIOLOGY & ADDITIONAL TESTS:  Results Reviewed:   Imaging Personally Reviewed:  Electrocardiogram Personally Reviewed:

## 2021-08-03 NOTE — PROGRESS NOTE ADULT - SUBJECTIVE AND OBJECTIVE BOX
[facility]                                                               Office: [address] [address]                                                              Telephone: [phone]. Fax:[phone]                                                                             PROGRESS NOTE  Reason for follow up: Acute HFrEF, CAD/ICM, Afib RVR  Overnight: No new events.   Update: remains with persistent Afib with episodes of RVR, SBP 100s on low dose Entresto; denies chest pain or palpitations      Review of symptoms:   Cardiac:  No chest pain. No dyspnea. No palpitations.  Respiratory: No cough. No dyspnea  Gastrointestinal: No diarrhea. No abdominal pain. No bleeding.     Past medical history: No updates.   	  Vital Signs Last 24 Hrs  T(C): 36.4 (08-03-21 @ 15:30), Max: 36.8 (08-02-21 @ 20:17)  T(F): 97.6 (08-03-21 @ 15:30), Max: 98.3 (08-02-21 @ 20:17)  HR: 86 (08-03-21 @ 15:30) (86 - 90)  BP: 106/75 (08-03-21 @ 15:30) (97/57 - 111/71)  BP(mean): --  RR: 17 (08-03-21 @ 15:30) (17 - 17)  SpO2: 97% (08-03-21 @ 15:30) (96% - 97%)  I&O's Summary    02 Aug 2021 07:01  -  03 Aug 2021 07:00  --------------------------------------------------------  IN: 240 mL / OUT: 475 mL / NET: -235 mL          PHYSICAL EXAM:  Appearance: Comfortable. No acute distress  HEENT:  Head and neck: Atraumatic. Normocephalic.  Normal oral mucosa, PERRL, Neck is supple. No JVD  Neurologic: A&Ox 2-2, no focal deficits. EOMI, Cranial nerves are intact.  Lymphatic: No cervical lymphadenopathy  Cardiovascular: Normal S1 S2, No murmur, rubs/gallops. No JVD, No edema  Respiratory: Lungs clear to auscultation  Gastrointestinal:  Soft, Non-tender, + BS  Lower Extremities: No edema  Psychiatry: Patient is calm. No agitation. Mood & affect appropriate  Skin: No rashes/ecchymoses/cyanosis/ulcers visualized on the face, hands or feet.      CURRENT MEDICATIONS:  MEDICATIONS  (STANDING):  aspirin  chewable 81 milliGRAM(s) Oral daily  atorvastatin 80 milliGRAM(s) Oral at bedtime  clopidogrel Tablet 75 milliGRAM(s) Oral daily  enoxaparin Injectable 70 milliGRAM(s) SubCutaneous every 12 hours  furosemide    Tablet 40 milliGRAM(s) Oral daily  metoprolol succinate  milliGRAM(s) Oral daily  polyethylene glycol 3350 17 Gram(s) Oral daily  senna 2 Tablet(s) Oral at bedtime    MEDICATIONS  (PRN):  acetaminophen   Tablet .. 650 milliGRAM(s) Oral every 6 hours PRN Temp greater or equal to 38.5C (101.3F), Mild Pain (1 - 3)  aluminum hydroxide/magnesium hydroxide/simethicone Suspension 30 milliLiter(s) Oral every 4 hours PRN Dyspepsia  melatonin 3 milliGRAM(s) Oral at bedtime PRN Insomnia  ondansetron Injectable 4 milliGRAM(s) IV Push every 8 hours PRN Nausea and/or Vomiting      DIAGNOSTIC TESTING:  [ ] Echocardiogram:   < from: TTE Echo Complete w/o Contrast w/ Doppler (07.30.21 @ 08:09) >  Summary:   1. Endocardial visualization was enhanced with intravenous echo contrast.   2. Left ventricular ejection fraction, by visual estimation, is 20 to 25%.   3. Severely decreased global left ventricular systolic function.   4. Multiple left ventricular regional wall motion abnormalities exist. See wall motion findings.   5. Severely enlarged left atrium.   6. Severely enlarged right atrium.   7. Mild mitral annular calcification.   8. Mild mitral valve regurgitation.   9. Moderate tricuspid regurgitation.  10. Peak transaortic gradient equals 14.0 mmHg, mean transaortic gradient equals 8.6 mmHg, the calculated aortic valve area equals 0.59 cm² by the continuity equation consistent with severe aortic stenosis. The Dimensionless Index value is 0.23, also consistent with severe AS.  11. Estimated pulmonary artery systolic pressure is 49.5 mmHg assuming a right atrial pressure of 8 mmHg, which is consistent with mild pulmonary hypertension.  12. Trivial pericardial effusion.  13. No prior studies are available for comparison.    < end of copied text >    [ ]  Catheterization:  cat< from: Cardiac Cath Lab - Adult (07.30.21 @ 10:28) >  DIAGNOSTIC IMPRESSIONS: There is significant triple vessel coronary artery  disease.  Prox LAD=99% (REYNALDO 2)  Ostial Diag=90%  Prox LPL=75%  Prox to Mid RCA=75% Left ventricular function is abnormal.  LVEF=30%  Patent Femoral Arteries  DIAGNOSTIC RECOMMENDATIONS: The patient should continue with the present  medications. Patient management should include aggressive medical therapy  and close monitoring of BUN and creatinine.  Will attempt PCI to the LAD and Diag with Impella Support    < end of copied text >    [ ] Stress Test:      Labs:                        15.4   10.10 )-----------( 182      ( 03 Aug 2021 05:51 )             46.9     08-02    141  |  103  |  29.2<H>  ----------------------------<  90  3.9   |  28.0  |  1.28    Ca    8.7      02 Aug 2021 06:37  Phos  3.3     08-02  Mg     2.0     08-02    TPro  5.7<L>  /  Alb  3.2<L>  /  TBili  0.7  /  DBili  x   /  AST  30  /  ALT  26  /  AlkPhos  88  08-02     02 Aug 2021 06:37 Troponin 1.87 ng/mL / Creatine Kinase x     /  CKMB x     / CPK Mass Assay % x       01 Aug 2021 12:07 Troponin 1.53 ng/mL / Creatine Kinase x     /  CKMB x     / CPK Mass Assay % x       30 Jul 2021 08:01 Troponin 0.49 ng/mL / Creatine Kinase x     /  CKMB x     / CPK Mass Assay % x          Serum Pro-Brain Natriuretic Peptide: 9980 pg/mL (07-29-21 @ 22:56)    Cholesterol 176 mg/dL; Direct LDL --; HDL Cholesterol, Serum 65 mg/dL; HDL/ Total Cholesterol Ratio Measurement --; Total Cholesterol/ HDL Ratio Measurement --; Triglycerides, Serum 50 mg/dL  A1C with Estimated Average Glucose Result: 5.5 % (07-30-21 @ 08:01)      TELEMETRY: Afib 80-90s, episodes of -140s

## 2021-08-04 LAB
ANION GAP SERPL CALC-SCNC: 18 MMOL/L — HIGH (ref 5–17)
BLD GP AB SCN SERPL QL: SIGNIFICANT CHANGE UP
BUN SERPL-MCNC: 27.6 MG/DL — HIGH (ref 8–20)
CALCIUM SERPL-MCNC: 9.1 MG/DL — SIGNIFICANT CHANGE UP (ref 8.6–10.2)
CHLORIDE SERPL-SCNC: 103 MMOL/L — SIGNIFICANT CHANGE UP (ref 98–107)
CO2 SERPL-SCNC: 20 MMOL/L — LOW (ref 22–29)
CREAT SERPL-MCNC: 1.15 MG/DL — SIGNIFICANT CHANGE UP (ref 0.5–1.3)
GLUCOSE SERPL-MCNC: 78 MG/DL — SIGNIFICANT CHANGE UP (ref 70–99)
HCT VFR BLD CALC: 50.8 % — HIGH (ref 39–50)
HGB BLD-MCNC: 16.4 G/DL — SIGNIFICANT CHANGE UP (ref 13–17)
MCHC RBC-ENTMCNC: 30.1 PG — SIGNIFICANT CHANGE UP (ref 27–34)
MCHC RBC-ENTMCNC: 32.3 GM/DL — SIGNIFICANT CHANGE UP (ref 32–36)
MCV RBC AUTO: 93.4 FL — SIGNIFICANT CHANGE UP (ref 80–100)
NT-PROBNP SERPL-SCNC: 9841 PG/ML — HIGH (ref 0–300)
PLATELET # BLD AUTO: 206 K/UL — SIGNIFICANT CHANGE UP (ref 150–400)
POTASSIUM SERPL-MCNC: 4.7 MMOL/L — SIGNIFICANT CHANGE UP (ref 3.5–5.3)
POTASSIUM SERPL-SCNC: 4.7 MMOL/L — SIGNIFICANT CHANGE UP (ref 3.5–5.3)
RBC # BLD: 5.44 M/UL — SIGNIFICANT CHANGE UP (ref 4.2–5.8)
RBC # FLD: 14.4 % — SIGNIFICANT CHANGE UP (ref 10.3–14.5)
SARS-COV-2 RNA SPEC QL NAA+PROBE: SIGNIFICANT CHANGE UP
SODIUM SERPL-SCNC: 141 MMOL/L — SIGNIFICANT CHANGE UP (ref 135–145)
TROPONIN T SERPL-MCNC: 1.35 NG/ML — HIGH (ref 0–0.06)
WBC # BLD: 11.64 K/UL — HIGH (ref 3.8–10.5)
WBC # FLD AUTO: 11.64 K/UL — HIGH (ref 3.8–10.5)

## 2021-08-04 PROCEDURE — 92928 PRQ TCAT PLMT NTRAC ST 1 LES: CPT | Mod: LD

## 2021-08-04 PROCEDURE — 99232 SBSQ HOSP IP/OBS MODERATE 35: CPT

## 2021-08-04 PROCEDURE — 93010 ELECTROCARDIOGRAM REPORT: CPT

## 2021-08-04 PROCEDURE — 99233 SBSQ HOSP IP/OBS HIGH 50: CPT

## 2021-08-04 PROCEDURE — 33990 INSJ PERQ VAD L HRT ARTERIAL: CPT

## 2021-08-04 PROCEDURE — 99152 MOD SED SAME PHYS/QHP 5/>YRS: CPT

## 2021-08-04 RX ORDER — METOPROLOL TARTRATE 50 MG
25 TABLET ORAL ONCE
Refills: 0 | Status: COMPLETED | OUTPATIENT
Start: 2021-08-04 | End: 2021-08-04

## 2021-08-04 RX ADMIN — ATORVASTATIN CALCIUM 80 MILLIGRAM(S): 80 TABLET, FILM COATED ORAL at 21:45

## 2021-08-04 RX ADMIN — Medication 81 MILLIGRAM(S): at 09:39

## 2021-08-04 RX ADMIN — Medication 40 MILLIGRAM(S): at 06:01

## 2021-08-04 RX ADMIN — Medication 100 MILLIGRAM(S): at 06:01

## 2021-08-04 RX ADMIN — Medication 25 MILLIGRAM(S): at 19:16

## 2021-08-04 RX ADMIN — CLOPIDOGREL BISULFATE 75 MILLIGRAM(S): 75 TABLET, FILM COATED ORAL at 09:39

## 2021-08-04 RX ADMIN — SENNA PLUS 2 TABLET(S): 8.6 TABLET ORAL at 21:45

## 2021-08-04 NOTE — PROGRESS NOTE ADULT - SUBJECTIVE AND OBJECTIVE BOX
Department of Cardiology                                                                  [facility]/[place] [address] [address]                                                            Telephone: [phone]. Fax:[phone]                                                                             Pre- Procedure H + P/Progress Note      HPI:   87 yo M with no PMHx presents to the ER co chest pain x 3 days. Patient states intermittent pain  x  3 days but since this evening having constant pressure like pain in the chest 7/10 radiating to the bilateral shoulders associated with shortness of breath. Chest pain get worse with activity. Denies alleviating factors. Found to be in AF with RVR by EMS. Treated with 324mg aspirin and SL ntg x 2 prior to ED arrival with some improvement in symptoms. Denies palpitations, dizziness, fatigue, syncope or abdominal pain. Troponin positive x 2. POD 1 LHC via RFA (failed RRA access) which revealed significant triple vessel coronary artery disease; Prox LAD=99%, Ostial Diag=90%. Prox LPL=75%, Prox to Mid RCA=75% Left ventricular function is abnormal LVEF=30%      Symptoms:        Angina (Class): IV       Ischemic Symptoms:     Heart Failure:        Systolic/Diastolic/Combined:        NYHA Class (within 2 weeks):     Assessment of LVEF (Must be within 6 months):       EF:        Assessed by:        Date:     Prior Cardiac Interventions:         Noninvasive Testing:   Stress Test: Date:        Protocol:        Duration of Exercise:        Symptoms:        EKG Changes:        DTS:        Myocardial Imaging:        Risk Assessment (Low, Medium, High):     Echo (Date, Findings):     Additional Diagnostics:    Risk Assessments:  ASA:  Mallampati:  Bleeding Risk:  Creatinine:  GFR:    Associated Risk Factors:        Cerebrovascular Disease: N/A       Chronic Lung Disease: N/A       Peripheral Arterial Disease: N/A       Chronic Kidney Disease (if yes, what is GFR): N/A       Uncontrolled Diabetes (if yes, what is HgbA1C or FBS): N/A       Poorly Controlled Hypertension (if yes, what is SBP): N/A       Morbid Obesity (if yes, what is BMI): N/A       History of Recent Ventricular Arrhythmia: N/A       Inability to Ambulate Safely: N/A       Need for Therapeutic Anticoagulation: N/A       Antiplatelet or Contrast Allergy: N/A    Antianginal Therapies:        Beta Blockers:         Calcium Channel Blockers:        Long Acting Nitrates:        Ranexa:     	  MEDICATIONS:  furosemide    Tablet 40 milliGRAM(s) Oral daily  metoprolol succinate  milliGRAM(s) Oral daily        acetaminophen   Tablet .. 650 milliGRAM(s) Oral every 6 hours PRN  melatonin 3 milliGRAM(s) Oral at bedtime PRN  ondansetron Injectable 4 milliGRAM(s) IV Push every 8 hours PRN    aluminum hydroxide/magnesium hydroxide/simethicone Suspension 30 milliLiter(s) Oral every 4 hours PRN  polyethylene glycol 3350 17 Gram(s) Oral daily  senna 2 Tablet(s) Oral at bedtime    atorvastatin 80 milliGRAM(s) Oral at bedtime    aspirin  chewable 81 milliGRAM(s) Oral daily  clopidogrel Tablet 75 milliGRAM(s) Oral daily        ROS: as stated above, otherwise negative    PHYSICAL EXAM:  Constitutional: A & O x 3  HEENT:   Normal oral mucosa, PERRL, EOMI	  Cardiovascular: Normal S1 S2, No JVD, No murmurs, No edema  Respiratory: Lungs clear to auscultation	  Gastrointestinal:  Soft, Non-tender, + BS	  Skin: No rashes, No ecchymoses, No cyanosis  Neurologic: Non-focal  Extremities: Normal range of motion, No clubbing, cyanosis or edema  Vascular: Peripheral pulses palpable 2+ bilaterally      T(C): 36.3 (21 @ 08:26), Max: 36.8 (21 @ 22:12)  HR: 83 (21 @ 08:26) (69 - 92)  BP: 113/71 (21 @ 08:26) (106/75 - 117/93)  RR: 18 (21 @ 08:26) (17 - 18)  SpO2: 95% (21 @ 08:26) (95% - 98%)  Wt(kg): --      I&O's Summary    03 Aug 2021 07:  -  04 Aug 2021 07:00  --------------------------------------------------------  IN: 0 mL / OUT: 150 mL / NET: -150 mL    04 Aug 2021 07:  -  04 Aug 2021 14:13  --------------------------------------------------------  IN: 0 mL / OUT: 300 mL / NET: -300 mL        Daily     Daily Weight in k.1 (04 Aug 2021 06:24)    TELEMETRY: 	      ECG:  	    LABS:	 	                                    16.4   11.64 )-----------( 206      ( 04 Aug 2021 05:27 )             50.8             Tnl:    Lipid Profile:   TC  TG  LDL  HDL    HgA1c:     proBNP: Serum Pro-Brain Natriuretic Peptide: 9841 pg/mL ( @ 05:27)      TSH:     Impression:      Plan:  -plan for LHC via RA vs FA  -patient seen and examined  -confirmed appropriate NPO duration  -ECG and Labs reviewed  -Aspirin 81mg po pre-cath  -procedure discussed with patient; risks and benefits explained, questions answered  -consent obtained by attending IC                                                                               Department of Cardiology                                                                  [facility]/[place] [address] [address]                                                            Telephone: [phone]. 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 Now presents to the CCL for impella supported PCI to pLAD/D1 to be performed by Dr Acevedo.          Symptoms:        Angina (Class): IV       Ischemic Symptoms:     Heart Failure:        Systolic/Diastolic/Combined:        NYHA Class (within 2 weeks):     Assessment of LVEF (Must be within 6 months):       EF:        Assessed by:        Date:     Prior Cardiac Interventions:         Noninvasive Testing:   Stress Test: Date:        Protocol:        Duration of Exercise:        Symptoms:        EKG Changes:        DTS:        Myocardial Imaging:        Risk Assessment (Low, Medium, High):     Echo (Date, Findings):     Additional Diagnostics:    Risk Assessments:  ASA:  Mallampati:  Bleeding Risk:  Creatinine:  GFR:    Associated Risk Factors:        Cerebrovascular Disease: N/A       Chronic Lung Disease: N/A       Peripheral Arterial Disease: N/A       Chronic Kidney Disease (if yes, what is GFR): N/A       Uncontrolled Diabetes (if yes, what is HgbA1C or FBS): N/A       Poorly Controlled Hypertension (if yes, what is SBP): N/A       Morbid Obesity (if yes, what is BMI): N/A       History of Recent Ventricular Arrhythmia: N/A       Inability to Ambulate Safely: N/A       Need for Therapeutic Anticoagulation: N/A       Antiplatelet or Contrast Allergy: N/A    Antianginal Therapies:        Beta Blockers:         Calcium Channel Blockers:        Long Acting Nitrates:        Ranexa:     	  MEDICATIONS:  furosemide    Tablet 40 milliGRAM(s) Oral daily  metoprolol succinate  milliGRAM(s) Oral daily        acetaminophen   Tablet .. 650 milliGRAM(s) Oral every 6 hours PRN  melatonin 3 milliGRAM(s) Oral at bedtime PRN  ondansetron Injectable 4 milliGRAM(s) IV Push every 8 hours PRN    aluminum hydroxide/magnesium hydroxide/simethicone Suspension 30 milliLiter(s) Oral every 4 hours PRN  polyethylene glycol 3350 17 Gram(s) Oral daily  senna 2 Tablet(s) Oral at bedtime    atorvastatin 80 milliGRAM(s) Oral at bedtime    aspirin  chewable 81 milliGRAM(s) Oral daily  clopidogrel Tablet 75 milliGRAM(s) Oral daily        ROS: as stated above, otherwise negative    PHYSICAL EXAM:  Constitutional: A & O x 3  HEENT:   Normal oral mucosa, PERRL, EOMI	  Cardiovascular: Normal S1 S2, No JVD, No murmurs, No edema  Respiratory: Lungs clear to auscultation	  Gastrointestinal:  Soft, Non-tender, + BS	  Skin: No rashes, No ecchymoses, No cyanosis  Neurologic: Non-focal  Extremities: Normal range of motion, No clubbing, cyanosis or edema  Vascular: Peripheral pulses palpable 2+ bilaterally      T(C): 36.3 (21 @ 08:26), Max: 36.8 (21 @ 22:12)  HR: 83 (21 @ 08:26) (69 - 92)  BP: 113/71 (21 @ 08:26) (106/75 - 117/93)  RR: 18 (21 @ 08:26) (17 - 18)  SpO2: 95% (21 @ 08:26) (95% - 98%)  Wt(kg): --      I&O's Summary    03 Aug 2021 07:  -  04 Aug 2021 07:00  --------------------------------------------------------  IN: 0 mL / OUT: 150 mL / NET: -150 mL    04 Aug 2021 07:  -  04 Aug 2021 14:13  --------------------------------------------------------  IN: 0 mL / OUT: 300 mL / NET: -300 mL        Daily     Daily Weight in k.1 (04 Aug 2021 06:24)    TELEMETRY: 	      ECG:  	    LABS:	 	                                    16.4   11.64 )-----------( 206      ( 04 Aug 2021 05:27 )             50.8             Tnl:    Lipid Profile:   TC  TG  LDL  HDL    HgA1c:     proBNP: Serum Pro-Brain Natriuretic Peptide: 9841 pg/mL ( @ 05:27)      TSH:     Impression:      Plan:  -plan for LHC via RA vs FA  -patient seen and examined  -confirmed appropriate NPO duration  -ECG and Labs reviewed  -Aspirin 81mg po pre-cath  -procedure discussed with patient; risks and benefits explained, questions answered  -consent obtained by attending IC                                                                               Department of Cardiology                                                                  [facility]/[place] [address] [address] [facility]                                                            Telephone: [phone]. Fax:[phone]                                                                             Pre- Procedure H + P/Progress Note      HPI: 87 yo male with no PMHx c/o intermittent CP X 3 days, then in the evening 21 having constant chest pressure 7/10 radiating to bilateral shoulders a/w shortness of breath and subsequently BIBA to the ED, given ASA 325mg and SL Ntg by EMS with some symptom improvement, +NSTEMI with peak tnl 1.87, also found to be in new onset MALA treated with IV lopressor and converted to SR, TTE revealed EF 20-25%, mod TR, mild PHTN, severe low flow/low gradient AS, cath on 21 with 99% thrombotic lesion pLAD and D1 90% disease, also with 75% lesion LPL and mRCA, decision to manage medically and plan for stage PCI. Now presents to the CCL for impella supported PCI to pLAD/D1 to be performed by Dr Acevedo.          Symptoms:        Angina (Class): IV       Ischemic Symptoms: resting CP +NSTEMI    Heart Failure:        Systolic/Diastolic/Combined: severe LV dysfunction, EF 20-25%       NYHA Class (within 2 weeks): yes    Assessment of LVEF (Must be within 6 months):       EF: 20-25%       Assessed by: TTE       Date: 21    Prior Cardiac Interventions:  cath 21  CORONARY VESSELS: The coronary circulation is right dominant.  LM:   --LM: The vessel was normal sized, not calcified, and not tortuous.  Angiography showed no evidence of disease.  LAD:   --  LAD: The vessel was normal sized, moderately calcified, and not  tortuous. Angiography showed severe atherosclerosis. There was a tubular  99 % stenosis in the proximal third of the vessel segment, just before S1.  The lesion was associated with a small filling defect consistent with  thrombus. There was REYNALDO grade 2 flow through the vessel (partial  perfusion). This lesion rojelio likely culprit for the patient's recent  myocardial infarction. It appears amenable to percutaneous intervention.  CX:   --  Circumflex: The vessel was normal sized, mildly calcified, and  mildly tortuous. Angiography showed minor luminal irregularities with no  flow limiting lesions.  --  LPL1: The vessel was normal sized and mildly calcified. Angiography  showed moderate atherosclerosis. There was a tubular 75 % stenosis in the  proximal third of the vessel segment. There was REYNALDO grade 3 flow through  the vessel (brisk flow).  RCA:   --  RCA: The vessel was medium sized, moderately calcified, and not  tortuous. Angiography showed severe atherosclerosis. There was a diffuse  75 % stenosis in the proximal third of the vessel segment. The lesion was  without evidence of thrombus. There was REYNALDO grade 3 flow through the  vessel (brisk flow). In a second lesion, there was a diffuse 75 % stenosis  in the middle third of the vessel segment. There was REYNALDO grade 3 flow  through the vessel (brisk flow).           Echo (Date, Findings):   TTE 21  The mid and apical anterior septum, mid and apical inferior septum, mid and  apical inferior wall, mid inferolateral segment, apical anterior segment, and  apex are akinetic.    Right Ventricle: Normal right ventricular size and function.  Left Atrium: Severely enlarged left atrium.  Right Atrium: Severely enlarged right atrium.  Pericardium: Trivial pericardial effusion is present. The pericardial effusion is globally located around the entire heart.  Mitral Valve: There is mild mitral annular calcification. Mild mitral valve regurgitation is seen.  Tricuspid Valve: Structurally normal tricuspid valve, with normal leaflet excursion. Moderate tricuspid regurgitation is visualized. Estimated pulmonary artery systolic pressure is 49.5 mmHg assuming a right atrial pressure of 8 mmHg, which is consistent with mild pulmonary hypertension.  Aortic Valve: Peak transaortic gradient equals 14.0 mmHg, mean transaortic gradient equals 8.6 mmHg, the calculated aortic valve area equals 0.59 cm² by the continuity equation consistent with severe aortic stenosis. No evidence of aortic valve regurgitation is seen.  Pulmonic Valve: Structurally normal pulmonic valve, with normal leaflet excursion. Tracepulmonic valve regurgitation.  Aorta: The aortic root is normal in size and structure.  Pulmonary Artery: The main pulmonary artery is normal in size.  Venous: The inferior vena cava was dilated, with respiratory size variation greater than 50%.  In comparison to the previous echocardiogram(s): There are no prior studies on this patient for comparison purposes. No prior studies are available for comparison.        Additional Diagnostics:    Risk Assessments:  ASA: 3  Mallampati: 2  Bleeding Risk: 10.4%  Creatinine: 1.28  GFR: 50    Associated Risk Factors:        Cerebrovascular Disease: N/A       Chronic Lung Disease: N/A       Peripheral Arterial Disease: N/A       Chronic Kidney Disease (if yes, what is GFR): N/A       Uncontrolled Diabetes (if yes, what is HgbA1C or FBS): N/A       Poorly Controlled Hypertension (if yes, what is SBP): N/A       Morbid Obesity (if yes, what is BMI): N/A       History of Recent Ventricular Arrhythmia: N/A       Inability to Ambulate Safely: N/A       Need for Therapeutic Anticoagulation: N/A       Antiplatelet or Contrast Allergy: N/A    Antianginal Therapies:        Beta Blockers:  Toprol       Calcium Channel Blockers:        Long Acting Nitrates:        Ranexa:     	  MEDICATIONS:  furosemide    Tablet 40 milliGRAM(s) Oral daily  metoprolol succinate  milliGRAM(s) Oral daily  acetaminophen   Tablet .. 650 milliGRAM(s) Oral every 6 hours PRN  melatonin 3 milliGRAM(s) Oral at bedtime PRN  ondansetron Injectable 4 milliGRAM(s) IV Push every 8 hours PRN  aluminum hydroxide/magnesium hydroxide/simethicone Suspension 30 milliLiter(s) Oral every 4 hours PRN  polyethylene glycol 3350 17 Gram(s) Oral daily  senna 2 Tablet(s) Oral at bedtime  atorvastatin 80 milliGRAM(s) Oral at bedtime  aspirin  chewable 81 milliGRAM(s) Oral daily  clopidogrel Tablet 75 milliGRAM(s) Oral daily      ROS: as stated above, otherwise negative    PHYSICAL EXAM:  Constitutional: A & O x 3  HEENT:   Normal oral mucosa, PERRL, EOMI	  Cardiovascular: Normal S1 S2, No JVD, No murmurs, No edema  Respiratory: Lungs clear to auscultation	  Gastrointestinal:  Soft, Non-tender, + BS	  Skin: No rashes, No ecchymoses, No cyanosis  Neurologic: Non-focal  Extremities: Normal range of motion, No clubbing, cyanosis or edema  Vascular: Peripheral pulses palpable 2+ bilaterally      T(C): 36.3 (21 @ 08:26), Max: 36.8 (21 @ 22:12)  HR: 83 (21 @ 08:26) (69 - 92)  BP: 113/71 (21 @ 08:26) (106/75 - 117/93)  RR: 18 (21 @ 08:26) (17 - 18)  SpO2: 95% (21 @ 08:26) (95% - 98%)  Wt 156lbs      I&O's Summary    03 Aug 2021 07:01  -  04 Aug 2021 07:00  --------------------------------------------------------  IN: 0 mL / OUT: 150 mL / NET: -150 mL    04 Aug 2021 07:01  -  04 Aug 2021 14:13  --------------------------------------------------------  IN: 0 mL / OUT: 300 mL / NET: -300 mL        Daily Weight in k.1 (04 Aug 2021 06:24)    TELEMETRY: 	      ECG:  	    LABS:	 	               16.4   11.64 )-----------( 206      ( 04 Aug 2021 05:27 )             50.8             Tnl: 1.5>>1.87>>1.35    Lipid Profile:     TG 50    HDL 65    HgA1c:     proBNP: Serum Pro-Brain Natriuretic Peptide: 9841 pg/mL ( @ 05:27)        Impression:  87 yo male with no PMHx c/o intermittent CP X 3 days, then in the evening 21 having constant chest pressure 7/10 radiating to bilateral shoulders a/w shortness of breath and subsequently BIBA to the ED, given ASA 325mg and SL Ntg by EMS with some symptom improvement, +NSTEMI with peak tnl 1.87, also found to be in new onset MALA treated with IV lopressor and converted to SR, TTE revealed EF 20-25%, mod TR, mild PHTN, severe low flow/low gradient AS, cath on 21 with 99% thrombotic lesion pLAD and D1 90% disease, also with 75% lesion LPL and mRCA, decision to manage medically and plan for stage PCI. Now presents to the CCL for Impella supported PCI to pLAD/D1 to be performed by Dr Acevedo.          Plan:  -plan for LHC via RA vs FA  -patient seen and examined  -confirmed appropriate NPO duration  -ECG and Labs reviewed  -Aspirin 81mg po pre-cath  -procedure discussed with patient; risks and benefits explained, questions answered  -consent obtained by attending IC                                                                               Department of Cardiology                                                                  [facility]/[place] [address] [address] [facility]                                                            Telephone: [phone]. Fax:[phone]                                                                             Pre- Procedure H + P/Progress Note      HPI: 89 yo male with no PMHx c/o intermittent CP X 3 days, then in the evening 21 having constant chest pressure 7/10 radiating to bilateral shoulders a/w shortness of breath and subsequently BIBA to the ED, given ASA 325mg and SL Ntg by EMS with some symptom improvement, +NSTEMI with peak tnl 1.87, also found to be in new onset MALA treated with IV lopressor and initially converted to SR but intermittently in and out of AF, TTE revealed EF 20-25%, mod TR, mild PHTN, severe low flow/low gradient AS, 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 Now presents to the CCL for impella supported PCI to pLAD/D1 to be performed by Dr Acevedo.          Symptoms:        Angina (Class): IV       Ischemic Symptoms: resting CP +NSTEMI    Heart Failure:        Systolic/Diastolic/Combined: severe LV dysfunction, EF 20-25%       NYHA Class (within 2 weeks): yes    Assessment of LVEF (Must be within 6 months):       EF: 20-25%       Assessed by: TTE       Date: 21    Prior Cardiac Interventions:  cath 21  CORONARY VESSELS: The coronary circulation is right dominant.  LM:   --LM: The vessel was normal sized, not calcified, and not tortuous.  Angiography showed no evidence of disease.  LAD:   --  LAD: The vessel was normal sized, moderately calcified, and not  tortuous. Angiography showed severe atherosclerosis. There was a tubular  99 % stenosis in the proximal third of the vessel segment, just before S1.  The lesion was associated with a small filling defect consistent with  thrombus. There was REYNALDO grade 2 flow through the vessel (partial  perfusion). This lesion rojelio likely culprit for the patient's recent  myocardial infarction. It appears amenable to percutaneous intervention.  CX:   --  Circumflex: The vessel was normal sized, mildly calcified, and  mildly tortuous. Angiography showed minor luminal irregularities with no  flow limiting lesions.  --  LPL1: The vessel was normal sized and mildly calcified. Angiography  showed moderate atherosclerosis. There was a tubular 75 % stenosis in the  proximal third of the vessel segment. There was REYNALDO grade 3 flow through  the vessel (brisk flow).  RCA:   --  RCA: The vessel was medium sized, moderately calcified, and not  tortuous. Angiography showed severe atherosclerosis. There was a diffuse  75 % stenosis in the proximal third of the vessel segment. The lesion was  without evidence of thrombus. There was REYNALDO grade 3 flow through the  vessel (brisk flow). In a second lesion, there was a diffuse 75 % stenosis  in the middle third of the vessel segment. There was REYNALDO grade 3 flow  through the vessel (brisk flow).           Echo (Date, Findings):   TTE 21  The mid and apical anterior septum, mid and apical inferior septum, mid and  apical inferior wall, mid inferolateral segment, apical anterior segment, and  apex are akinetic.    Right Ventricle: Normal right ventricular size and function.  Left Atrium: Severely enlarged left atrium.  Right Atrium: Severely enlarged right atrium.  Pericardium: Trivial pericardial effusion is present. The pericardial effusion is globally located around the entire heart.  Mitral Valve: There is mild mitral annular calcification. Mild mitral valve regurgitation is seen.  Tricuspid Valve: Structurally normal tricuspid valve, with normal leaflet excursion. Moderate tricuspid regurgitation is visualized. Estimated pulmonary artery systolic pressure is 49.5 mmHg assuming a right atrial pressure of 8 mmHg, which is consistent with mild pulmonary hypertension.  Aortic Valve: Peak transaortic gradient equals 14.0 mmHg, mean transaortic gradient equals 8.6 mmHg, the calculated aortic valve area equals 0.59 cm² by the continuity equation consistent with severe aortic stenosis. No evidence of aortic valve regurgitation is seen.  Pulmonic Valve: Structurally normal pulmonic valve, with normal leaflet excursion. Tracepulmonic valve regurgitation.  Aorta: The aortic root is normal in size and structure.  Pulmonary Artery: The main pulmonary artery is normal in size.  Venous: The inferior vena cava was dilated, with respiratory size variation greater than 50%.  In comparison to the previous echocardiogram(s): There are no prior studies on this patient for comparison purposes. No prior studies are available for comparison.        Additional Diagnostics:    Risk Assessments:  ASA: 3  Mallampati: 2  Bleeding Risk: 10.4%  Creatinine: 1.28  GFR: 50    Associated Risk Factors:        Cerebrovascular Disease: N/A       Chronic Lung Disease: N/A       Peripheral Arterial Disease: N/A       Chronic Kidney Disease (if yes, what is GFR): N/A       Uncontrolled Diabetes (if yes, what is HgbA1C or FBS): N/A       Poorly Controlled Hypertension (if yes, what is SBP): N/A       Morbid Obesity (if yes, what is BMI): N/A       History of Recent Ventricular Arrhythmia: N/A       Inability to Ambulate Safely: N/A       Need for Therapeutic Anticoagulation: N/A       Antiplatelet or Contrast Allergy: N/A    Antianginal Therapies:        Beta Blockers:  Toprol       Calcium Channel Blockers:        Long Acting Nitrates:        Ranexa:     	  MEDICATIONS:  furosemide    Tablet 40 milliGRAM(s) Oral daily  metoprolol succinate  milliGRAM(s) Oral daily  acetaminophen   Tablet .. 650 milliGRAM(s) Oral every 6 hours PRN  melatonin 3 milliGRAM(s) Oral at bedtime PRN  ondansetron Injectable 4 milliGRAM(s) IV Push every 8 hours PRN  aluminum hydroxide/magnesium hydroxide/simethicone Suspension 30 milliLiter(s) Oral every 4 hours PRN  polyethylene glycol 3350 17 Gram(s) Oral daily  senna 2 Tablet(s) Oral at bedtime  atorvastatin 80 milliGRAM(s) Oral at bedtime  aspirin  chewable 81 milliGRAM(s) Oral daily  clopidogrel Tablet 75 milliGRAM(s) Oral daily      ROS: as stated above, otherwise negative    PHYSICAL EXAM:  Constitutional: A & O x 3  HEENT:   Normal oral mucosa, PERRL, EOMI	  Cardiovascular: Normal S1 S2, No JVD, No murmurs, No edema  Respiratory: Lungs clear to auscultation	  Gastrointestinal:  Soft, Non-tender, + BS	  Skin: No rashes, No ecchymoses, No cyanosis  Neurologic: Non-focal  Extremities: Normal range of motion, No clubbing, cyanosis or edema  Vascular: Peripheral pulses palpable 2+ bilaterally      T(C): 36.3 (21 @ 08:26), Max: 36.8 (21 @ 22:12)  HR: 83 (21 @ 08:26) (69 - 92)  BP: 113/71 (21 @ 08:26) (106/75 - 117/93)  RR: 18 (21 @ 08:26) (17 - 18)  SpO2: 95% (21 @ 08:26) (95% - 98%)  Wt 156lbs      I&O's Summary    03 Aug 2021 07:01  -  04 Aug 2021 07:00  --------------------------------------------------------  IN: 0 mL / OUT: 150 mL / NET: -150 mL    04 Aug 2021 07:01  -  04 Aug 2021 14:13  --------------------------------------------------------  IN: 0 mL / OUT: 300 mL / NET: -300 mL        Daily Weight in k.1 (04 Aug 2021 06:24)    TELEMETRY: 	      ECG:  	    LABS:	 	               16.4   11.64 )-----------( 206      ( 04 Aug 2021 05:27 )             50.8             Tnl: 1.5>>1.87>>1.35    Lipid Profile:     TG 50    HDL 65    HgA1c:     proBNP: Serum Pro-Brain Natriuretic Peptide: 9841 pg/mL ( @ 05:27)        Impression:  89 yo male with no PMHx c/o intermittent CP X 3 days, then in the evening 21 having constant chest pressure 7/10 radiating to bilateral shoulders a/w shortness of breath and subsequently BIBA to the ED, given ASA 325mg and SL Ntg by EMS with some symptom improvement, +NSTEMI with peak tnl 1.87, also found to be in new onset MALA treated with IV lopressor and converted to SR, TTE revealed EF 20-25%, mod TR, mild PHTN, severe low flow/low gradient AS, 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 Now presents to the [facility] for Impella supported PCI to pLAD/D1 to be performed by Dr Acevedo.          Plan:  -plan for LHC via RA vs FA  -patient seen and examined  -confirmed appropriate NPO duration  -ECG and Labs reviewed  -Aspirin 81mg po pre-cath  -procedure discussed with patient; risks and benefits explained, questions answered  -consent obtained by attending IC                                                                               Department of Cardiology                                                                  [facility]/[place] [address] [facility] [place]                                                            Telephone: [phone]. Fax:[phone]                                                                             Pre- Procedure H + P/Progress Note      HPI: 87 yo male with no PMHx c/o intermittent CP X 3 days, then in the evening 21 having constant chest pressure 7/10 radiating to bilateral shoulders a/w shortness of breath and subsequently BIBA to the ED, given ASA 325mg and SL Ntg by EMS with some symptom improvement, +NSTEMI with peak tnl 1.87, also found to be in new onset MALA treated with IV lopressor and initially converted to SR but intermittently in and out of AF, TTE revealed EF 20-25%, mod TR, mild PHTN, severe low flow/low gradient AS, 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 Now presents to the [facility] for impella supported PCI to pLAD/D1 to be performed by Dr Acevedo.          Symptoms:        Angina (Class): IV       Ischemic Symptoms: resting CP +NSTEMI    Heart Failure:        Systolic/Diastolic/Combined: severe LV dysfunction, EF 20-25%       NYHA Class (within 2 weeks): yes    Assessment of LVEF (Must be within 6 months):       EF: 20-25%       Assessed by: TTE       Date: 21    Prior Cardiac Interventions:  cath 21  CORONARY VESSELS: The coronary circulation is right dominant.  LM:   --LM: The vessel was normal sized, not calcified, and not tortuous.  Angiography showed no evidence of disease.  LAD:   --  LAD: The vessel was normal sized, moderately calcified, and not  tortuous. Angiography showed severe atherosclerosis. There was a tubular  99 % stenosis in the proximal third of the vessel segment, just before S1.  The lesion was associated with a small filling defect consistent with  thrombus. There was REYNALDO grade 2 flow through the vessel (partial  perfusion). This lesion rojelio likely culprit for the patient's recent  myocardial infarction. It appears amenable to percutaneous intervention.  CX:   --  Circumflex: The vessel was normal sized, mildly calcified, and  mildly tortuous. Angiography showed minor luminal irregularities with no  flow limiting lesions.  --  LPL1: The vessel was normal sized and mildly calcified. Angiography  showed moderate atherosclerosis. There was a tubular 75 % stenosis in the  proximal third of the vessel segment. There was REYNALDO grade 3 flow through  the vessel (brisk flow).  RCA:   --  RCA: The vessel was medium sized, moderately calcified, and not  tortuous. Angiography showed severe atherosclerosis. There was a diffuse  75 % stenosis in the proximal third of the vessel segment. The lesion was  without evidence of thrombus. There was REYNALDO grade 3 flow through the  vessel (brisk flow). In a second lesion, there was a diffuse 75 % stenosis  in the middle third of the vessel segment. There was REYNALDO grade 3 flow  through the vessel (brisk flow).           Echo (Date, Findings):   TTE 21  The mid and apical anterior septum, mid and apical inferior septum, mid and  apical inferior wall, mid inferolateral segment, apical anterior segment, and  apex are akinetic.    Right Ventricle: Normal right ventricular size and function.  Left Atrium: Severely enlarged left atrium.  Right Atrium: Severely enlarged right atrium.  Pericardium: Trivial pericardial effusion is present. The pericardial effusion is globally located around the entire heart.  Mitral Valve: There is mild mitral annular calcification. Mild mitral valve regurgitation is seen.  Tricuspid Valve: Structurally normal tricuspid valve, with normal leaflet excursion. Moderate tricuspid regurgitation is visualized. Estimated pulmonary artery systolic pressure is 49.5 mmHg assuming a right atrial pressure of 8 mmHg, which is consistent with mild pulmonary hypertension.  Aortic Valve: Peak transaortic gradient equals 14.0 mmHg, mean transaortic gradient equals 8.6 mmHg, the calculated aortic valve area equals 0.59 cm² by the continuity equation consistent with severe aortic stenosis. No evidence of aortic valve regurgitation is seen.  Pulmonic Valve: Structurally normal pulmonic valve, with normal leaflet excursion. Tracepulmonic valve regurgitation.  Aorta: The aortic root is normal in size and structure.  Pulmonary Artery: The main pulmonary artery is normal in size.  Venous: The inferior vena cava was dilated, with respiratory size variation greater than 50%.  In comparison to the previous echocardiogram(s): There are no prior studies on this patient for comparison purposes. No prior studies are available for comparison.        Additional Diagnostics:    Risk Assessments:  ASA: 3  Mallampati: 2  Bleeding Risk: 10.4%  Creatinine: 1.28  GFR: 50    Associated Risk Factors:        Cerebrovascular Disease: N/A       Chronic Lung Disease: N/A       Peripheral Arterial Disease: N/A       Chronic Kidney Disease (if yes, what is GFR): N/A       Uncontrolled Diabetes (if yes, what is HgbA1C or FBS): N/A       Poorly Controlled Hypertension (if yes, what is SBP): N/A       Morbid Obesity (if yes, what is BMI): N/A       History of Recent Ventricular Arrhythmia: N/A       Inability to Ambulate Safely: N/A       Need for Therapeutic Anticoagulation: N/A       Antiplatelet or Contrast Allergy: N/A    Antianginal Therapies:        Beta Blockers:  Toprol       Calcium Channel Blockers:        Long Acting Nitrates:        Ranexa:     	  MEDICATIONS:  furosemide    Tablet 40 milliGRAM(s) Oral daily  metoprolol succinate  milliGRAM(s) Oral daily  acetaminophen   Tablet .. 650 milliGRAM(s) Oral every 6 hours PRN  melatonin 3 milliGRAM(s) Oral at bedtime PRN  ondansetron Injectable 4 milliGRAM(s) IV Push every 8 hours PRN  aluminum hydroxide/magnesium hydroxide/simethicone Suspension 30 milliLiter(s) Oral every 4 hours PRN  polyethylene glycol 3350 17 Gram(s) Oral daily  senna 2 Tablet(s) Oral at bedtime  atorvastatin 80 milliGRAM(s) Oral at bedtime  aspirin  chewable 81 milliGRAM(s) Oral daily  clopidogrel Tablet 75 milliGRAM(s) Oral daily      ROS: as stated above, otherwise negative    PHYSICAL EXAM:  Constitutional: A & O x 3  HEENT:   Normal oral mucosa, PERRL, EOMI	  Cardiovascular: Normal S1 S2, No JVD, No murmurs, No edema  Respiratory: Lungs clear to auscultation	  Gastrointestinal:  Soft, Non-tender, + BS	  Skin: No rashes, No ecchymoses, No cyanosis  Neurologic: Non-focal  Extremities: Normal range of motion, No clubbing, cyanosis or edema  Vascular: Peripheral pulses palpable 2+ bilaterally      T(C): 36.3 (21 @ 08:26), Max: 36.8 (21 @ 22:12)  HR: 83 (21 @ 08:26) (69 - 92)  BP: 113/71 (21 @ 08:26) (106/75 - 117/93)  RR: 18 (21 @ 08:26) (17 - 18)  SpO2: 95% (21 @ 08:26) (95% - 98%)  Wt 156lbs      I&O's Summary    03 Aug 2021 07:01  -  04 Aug 2021 07:00  --------------------------------------------------------  IN: 0 mL / OUT: 150 mL / NET: -150 mL    04 Aug 2021 07:01  -  04 Aug 2021 14:13  --------------------------------------------------------  IN: 0 mL / OUT: 300 mL / NET: -300 mL        Daily Weight in k.1 (04 Aug 2021 06:24)    TELEMETRY: AF 80's 	        LABS:	 	               16.4   11.64 )-----------( 206      ( 04 Aug 2021 05:27 )             50.8     Na 141  K 3.9  bUn 29  cr 1.28  GFR 50        Tnl: 1.5>>1.87>>1.35    Lipid Profile:     TG 50    HDL 65      proBNP: Serum Pro-Brain Natriuretic Peptide: 9841 pg/mL ( @ 05:27)        Impression:  87 yo male with no PMHx c/o intermittent CP X 3 days, then in the evening 21 having constant chest pressure 7/10 radiating to bilateral shoulders a/w shortness of breath and subsequently BIBA to the ED, given ASA 325mg and SL Ntg by EMS with some symptom improvement, +NSTEMI with peak tnl 1.87, also found to be in new onset MALA treated with IV lopressor and converted to SR, TTE revealed EF 20-25%, mod TR, mild PHTN, severe low flow/low gradient AS, 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 Now presents to the CCL for Impella supported PCI to pLAD/D1 to be performed by Dr Acevedo.        #NSTEMI with MV CAD (cath 21 with 3V CAD, 99% thrombotic culprit lesion pLAD and D1 90% disease, also with 75% lesion LPL and 75% lesion mRCA)  -Cont DAPT with ASA 81mg and Plavix 75mg po daily  -Cont Toprol 100mg po daily  -Cont high dose statin with Lipitor 80mg po qHS  -Plan for Impella supported PCI pLAD/D1 today by Dr Acevedo  -patient seen and examined  -confirmed appropriate NPO duration  -ECG and Labs reviewed  -procedure discussed with patient; risks and benefits explained, questions answered  -consent obtained by attending IC    #ICM with EF 20-25%  -Cont Toprol 100mg po daily  -Cont Lasix 40mg po daily  -Low dose Entresto held by cardiologist for PCI, resume post PCI if without contraindication  -Strict I + O  -daily weight      #A Fib with RVR now intermittent and rate controlled  -Cont Toprol 100mg po daily  -Cont tele  -EP consult if indicated    #Valvular HD with mod TR and severe low flow/low gradient AS on TTE ?MUSTAPHA may be underestimated due to reduced LVEF and CO  -Cont lasix 40mg po daily  -Repeat TTE to re-eval LVEF and severity of valvular disease when appropriate post PCI and optimization                                                                           Department of Cardiology                                                                  [facility]/[place] [address] [facility] [id number]                                                            Telephone: [phone]. Fax:[phone]                                                                             Pre- Procedure H + P/Progress Note      HPI: 87 yo male with no PMHx c/o intermittent CP X 3 days, then in the evening 21 having constant chest pressure 7/10 radiating to bilateral shoulders a/w shortness of breath and subsequently BIBA to the ED, given ASA 325mg and SL Ntg by EMS with some symptom improvement, +NSTEMI with peak tnl 1.87, also found to be in new onset MALA treated with IV lopressor and initially converted to SR but intermittently in and out of AF, TTE revealed EF 20-25%, mod TR, mild PHTN, severe low flow/low gradient AS, 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 Now presents to the CCL for impella supported PCI to pLAD to be performed by Dr Acevedo.          Symptoms:        Angina (Class): IV       Ischemic Symptoms: resting CP +NSTEMI    Heart Failure:        Systolic/Diastolic/Combined: severe LV dysfunction, EF 20-25%       NYHA Class (within 2 weeks): yes    Assessment of LVEF (Must be within 6 months):       EF: 20-25%       Assessed by: TTE       Date: 21    Prior Cardiac Interventions:  cath 21  CORONARY VESSELS: The coronary circulation is right dominant.  LM:   --LM: The vessel was normal sized, not calcified, and not tortuous.  Angiography showed no evidence of disease.  LAD:   --  LAD: The vessel was normal sized, moderately calcified, and not  tortuous. Angiography showed severe atherosclerosis. There was a tubular  99 % stenosis in the proximal third of the vessel segment, just before S1.  The lesion was associated with a small filling defect consistent with  thrombus. There was REYNALDO grade 2 flow through the vessel (partial  perfusion). This lesion rojelio likely culprit for the patient's recent  myocardial infarction. It appears amenable to percutaneous intervention.  CX:   --  Circumflex: The vessel was normal sized, mildly calcified, and  mildly tortuous. Angiography showed minor luminal irregularities with no  flow limiting lesions.  --  LPL1: The vessel was normal sized and mildly calcified. Angiography  showed moderate atherosclerosis. There was a tubular 75 % stenosis in the  proximal third of the vessel segment. There was REYNALDO grade 3 flow through  the vessel (brisk flow).  RCA:   --  RCA: The vessel was medium sized, moderately calcified, and not  tortuous. Angiography showed severe atherosclerosis. There was a diffuse  75 % stenosis in the proximal third of the vessel segment. The lesion was  without evidence of thrombus. There was REYNALDO grade 3 flow through the  vessel (brisk flow). In a second lesion, there was a diffuse 75 % stenosis  in the middle third of the vessel segment. There was REYNALDO grade 3 flow  through the vessel (brisk flow).           Echo (Date, Findings):   TTE 21  The mid and apical anterior septum, mid and apical inferior septum, mid and  apical inferior wall, mid inferolateral segment, apical anterior segment, and  apex are akinetic.    Right Ventricle: Normal right ventricular size and function.  Left Atrium: Severely enlarged left atrium.  Right Atrium: Severely enlarged right atrium.  Pericardium: Trivial pericardial effusion is present. The pericardial effusion is globally located around the entire heart.  Mitral Valve: There is mild mitral annular calcification. Mild mitral valve regurgitation is seen.  Tricuspid Valve: Structurally normal tricuspid valve, with normal leaflet excursion. Moderate tricuspid regurgitation is visualized. Estimated pulmonary artery systolic pressure is 49.5 mmHg assuming a right atrial pressure of 8 mmHg, which is consistent with mild pulmonary hypertension.  Aortic Valve: Peak transaortic gradient equals 14.0 mmHg, mean transaortic gradient equals 8.6 mmHg, the calculated aortic valve area equals 0.59 cm² by the continuity equation consistent with severe aortic stenosis. No evidence of aortic valve regurgitation is seen.  Pulmonic Valve: Structurally normal pulmonic valve, with normal leaflet excursion. Tracepulmonic valve regurgitation.  Aorta: The aortic root is normal in size and structure.  Pulmonary Artery: The main pulmonary artery is normal in size.  Venous: The inferior vena cava was dilated, with respiratory size variation greater than 50%.  In comparison to the previous echocardiogram(s): There are no prior studies on this patient for comparison purposes. No prior studies are available for comparison.        Additional Diagnostics:    Risk Assessments:  ASA: 3  Mallampati: 2  Bleeding Risk: 10.4%  Creatinine: 1.28  GFR: 50    Associated Risk Factors:        Cerebrovascular Disease: N/A       Chronic Lung Disease: N/A       Peripheral Arterial Disease: N/A       Chronic Kidney Disease (if yes, what is GFR): N/A       Uncontrolled Diabetes (if yes, what is HgbA1C or FBS): N/A       Poorly Controlled Hypertension (if yes, what is SBP): N/A       Morbid Obesity (if yes, what is BMI): N/A       History of Recent Ventricular Arrhythmia: N/A       Inability to Ambulate Safely: N/A       Need for Therapeutic Anticoagulation: N/A       Antiplatelet or Contrast Allergy: N/A    Antianginal Therapies:        Beta Blockers:  Toprol       Calcium Channel Blockers:        Long Acting Nitrates:        Ranexa:     	  MEDICATIONS:  furosemide    Tablet 40 milliGRAM(s) Oral daily  metoprolol succinate  milliGRAM(s) Oral daily  acetaminophen   Tablet .. 650 milliGRAM(s) Oral every 6 hours PRN  melatonin 3 milliGRAM(s) Oral at bedtime PRN  ondansetron Injectable 4 milliGRAM(s) IV Push every 8 hours PRN  aluminum hydroxide/magnesium hydroxide/simethicone Suspension 30 milliLiter(s) Oral every 4 hours PRN  polyethylene glycol 3350 17 Gram(s) Oral daily  senna 2 Tablet(s) Oral at bedtime  atorvastatin 80 milliGRAM(s) Oral at bedtime  aspirin  chewable 81 milliGRAM(s) Oral daily  clopidogrel Tablet 75 milliGRAM(s) Oral daily      ROS: as stated above, otherwise negative    PHYSICAL EXAM:  Constitutional: A & O x 3  HEENT:   Normal oral mucosa, PERRL, EOMI	  Cardiovascular: Normal S1 S2, No JVD, No murmurs, No edema  Respiratory: Lungs clear to auscultation	  Gastrointestinal:  Soft, Non-tender, + BS	  Skin: No rashes, No ecchymoses, No cyanosis  Neurologic: Non-focal  Extremities: Normal range of motion, No clubbing, cyanosis or edema  Vascular: Peripheral pulses palpable 2+ bilaterally      T(C): 36.3 (21 @ 08:26), Max: 36.8 (21 @ 22:12)  HR: 83 (21 @ 08:26) (69 - 92)  BP: 113/71 (21 @ 08:26) (106/75 - 117/93)  RR: 18 (08-04-21 @ 08:26) (17 - 18)  SpO2: 95% (21 @ 08:26) (95% - 98%)  Wt 156lbs      I&O's Summary    03 Aug 2021 07:01  -  04 Aug 2021 07:00  --------------------------------------------------------  IN: 0 mL / OUT: 150 mL / NET: -150 mL    04 Aug 2021 07:01  -  04 Aug 2021 14:13  --------------------------------------------------------  IN: 0 mL / OUT: 300 mL / NET: -300 mL        Daily Weight in k.1 (04 Aug 2021 06:24)    TELEMETRY: AF 80's 	        LABS:	 	               16.4   11.64 )-----------( 206      ( 04 Aug 2021 05:27 )             50.8     Na 141  K 3.9  bUn 29  cr 1.28  GFR 50        Tnl: 1.5>>1.87>>1.35    Lipid Profile:     TG 50    HDL 65      proBNP: Serum Pro-Brain Natriuretic Peptide: 9841 pg/mL ( @ 05:27)        Impression:  87 yo male with no PMHx c/o intermittent CP X 3 days, then in the evening 21 having constant chest pressure 7/10 radiating to bilateral shoulders a/w shortness of breath and subsequently BIBA to the ED, given ASA 325mg and SL Ntg by EMS with some symptom improvement, +NSTEMI with peak tnl 1.87, also found to be in new onset MALA treated with IV lopressor and converted to SR, TTE revealed EF 20-25%, mod TR, mild PHTN, severe low flow/low gradient AS, 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 Now presents to the CCL for Impella supported PCI to pLAD to be performed by Dr Acevedo.        #NSTEMI with MV CAD (cath 21 with 3V CAD, 99% thrombotic culprit lesion pLAD and D1 90% disease, also with 75% lesion LPL and 75% lesion mRCA)  -Cont DAPT with ASA 81mg and Plavix 75mg po daily  -Cont Toprol 100mg po daily  -Cont high dose statin with Lipitor 80mg po qHS  -Plan for Impella supported PCI pLAD today by Dr Acevedo  -patient seen and examined  -confirmed appropriate NPO duration  -ECG and Labs reviewed  -procedure discussed with patient; risks and benefits explained, questions answered  -consent obtained by attending IC    #ICM with EF 20-25%  -Cont Toprol 100mg po daily  -Cont Lasix 40mg po daily  -Low dose Entresto held by cardiologist for PCI, resume post PCI if without contraindication  -Strict I + O  -daily weight      #A Fib with RVR now intermittent and rate controlled  -Cont Toprol 100mg po daily  -Cont tele  -EP consult if indicated    #Valvular HD with mod TR and severe low flow/low gradient AS on TTE ?MUSTAPHA may be underestimated due to reduced LVEF and CO  -Cont lasix 40mg po daily  -Repeat TTE to re-eval LVEF and severity of valvular disease when appropriate post PCI and optimization

## 2021-08-04 NOTE — PROGRESS NOTE ADULT - SUBJECTIVE AND OBJECTIVE BOX
Department of Cardiology                                                                  [facility]/[place] [address] [facility]                                                            Telephone: [phone]. Fax:[phone]                                                    Post- Procedure Note: Left Heart Cardiac Catheterization       Narrative:  88y  Male now s/p Impella supported [place] EMILEE 2.5 X 38mm to pLAD via RFA, procedure performed by  Dr. Acevedo, received Heparin for A/C with last  at 1659, Fentanyl and Versed IV intraprocedurally, arrived to recovery in NAD and HDS, Rpost CIE ECG with no acute changes, FA access site closed with perclose device, arrived with femstop in place for mild tract ooze, site stable, no bleed/hematoma, distal pulse + by doppler and no change from pre-procedure, transfer back ti in-pt unit once immediate recovery period completed and Femstop off without complication.           PAST MEDICAL & SURGICAL HISTORY:  No pertinent past medical history    No significant past surgical history        No Known Allergies      Objective:  Vital Signs Last 24 Hrs  T(C): 36.4 (04 Aug 2021 15:31), Max: 36.8 (03 Aug 2021 22:12)  T(F): 97.5 (04 Aug 2021 15:31), Max: 98.3 (03 Aug 2021 22:12)  HR: 98 (04 Aug 2021 17:24) (69 - 98)  BP: 133/98 (04 Aug 2021 17:24) (113/71 - 138/87)  BP(mean): --  RR: 20 (04 Aug 2021 17:24) (18 - 20)  SpO2: 97% (04 Aug 2021 17:24) (95% - 99%)      PHYSICAL EXAM:  Constitutional: A & O x 3, NAD  HEENT:  Normal oral mucosa, PERRL, EOMI	  Cardiovascular: S1 S2, No murmurs, No JVD  Respiratory: Lungs clear to auscultation	  Gastrointestinal:  Soft, Non-tender, + BS	  Skin: No rashes or cyanosis  Neurologic: No deficit appreciated  Extremities: Normal range of motion, no edema  Vascular: Access site stable, no bleed or hematoma, distal pulses +doppler     Labs:                           16.4   11.64 )-----------( 206      ( 04 Aug 2021 05:27 )             50.8     08-04    141  |  103  |  27.6<H>  ----------------------------<  78  4.7   |  20.0<L>  |  1.15    Ca    9.1      04 Aug 2021 05:27            Assessment: 89 yo male with no PMHx c/o intermittent CP X 3 days, then in the evening 7/29/21 having constant chest pressure 7/10 radiating to bilateral shoulders a/w shortness of breath and subsequently BIBA to the ED, given ASA 325mg and SL Ntg by EMS with some symptom improvement, +NSTEMI with peak tnl 1.87, also found to be in new onset MALA treated with IV lopressor and initially converted to SR but intermittently in and out of AF, TTE revealed EF 20-25%, mod TR, mild PHTN, severe low flow/low gradient AS, 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 Now presents to the CCL for impella supported PCI to pLAD to be performed by Dr Acevedo.         Now s/p Impella supported [place] EMILEE 2.5 X 38mm to pLAD via RFA, procedure performed by  Dr. Acevedo, received Heparin for A/C with last  at 1659, Fentanyl and Versed IV intraprocedurally, arrived to recovery in NAD and HDS, Rpost CIE ECG with no acute changes, FA access site closed with perclose device, arrived with femstop in place for mild tract ooze, site stable, no bleed/hematoma, distal pulse + by doppler and no change from pre-procedure, transfer back ti in-pt unit once immediate recovery period completed and Femstop off without complication.             Plan:  -Formal cath report pending  -Post procedure management/monitoring per protocol  -Access site precautions  -Bedrest x 3 hours post Femstop removal (~2130)  -Labs and EKG in am  -Repeat ECG if any clinical indication or change on tele  -Continue current medical therapy  -Dual anti platelet therapy with aspirin/plavix   -Cont BB with Toprol 100mg po daily   -Cont statin therapy with Lipitor 80mg po qHS   -Restart low dose Entresto tomorrow if blood pressure tolerates  -Consider starting A/C for AFib tomorrow if CBC and RFA site stable  -Educated regarding strict adherence with DAPT   -Educated regarding post procedure management and care  -Discussed the importance of RF modification  -Cardiac rehab info provided/referral and communication to cardiac rehab completed  -F/U outpt in 1-2 weeks with primary cardiologist  -DISPO: Plan for D/C when optimized per primary team                                                                            Department of Cardiology                                                                  [facility]/[place] [address] [address] [facility]                                                            Telephone: [phone]. Fax:[phone]                                                    Post- Procedure Note: Left Heart Cardiac Catheterization       Narrative:  88y  Male now s/p Impella supported [place] EMILEE 2.5 X 38mm to pLAD via RFA, procedure performed by  Dr. Acevedo, received Heparin for A/C with last  at 1659, Fentanyl and Versed IV intraprocedurally, arrived to recovery in NAD and HDS, Rpost CIE ECG with no acute changes, FA access site closed with perclose device, arrived with femstop in place for mild tract ooze, site stable, no bleed/hematoma, distal pulse + by doppler and no change from pre-procedure, transfer back ti in-pt unit once immediate recovery period completed and Femstop off without complication.           PAST MEDICAL & SURGICAL HISTORY:  No pertinent past medical history    No significant past surgical history        No Known Allergies      Objective:  Vital Signs Last 24 Hrs  T(C): 36.4 (04 Aug 2021 15:31), Max: 36.8 (03 Aug 2021 22:12)  T(F): 97.5 (04 Aug 2021 15:31), Max: 98.3 (03 Aug 2021 22:12)  HR: 98 (04 Aug 2021 17:24) (69 - 98)  BP: 133/98 (04 Aug 2021 17:24) (113/71 - 138/87)  BP(mean): --  RR: 20 (04 Aug 2021 17:24) (18 - 20)  SpO2: 97% (04 Aug 2021 17:24) (95% - 99%)      PHYSICAL EXAM:  Constitutional: A & O x 3, NAD  HEENT:  Normal oral mucosa, PERRL, EOMI	  Cardiovascular: S1 S2, No murmurs, No JVD  Respiratory: Lungs clear to auscultation	  Gastrointestinal:  Soft, Non-tender, + BS	  Skin: No rashes or cyanosis  Neurologic: No deficit appreciated  Extremities: Normal range of motion, no edema  Vascular: Access site stable, no bleed or hematoma, distal pulses +doppler     Labs:                           16.4   11.64 )-----------( 206      ( 04 Aug 2021 05:27 )             50.8     08-04    141  |  103  |  27.6<H>  ----------------------------<  78  4.7   |  20.0<L>  |  1.15    Ca    9.1      04 Aug 2021 05:27            Assessment: 87 yo male with no PMHx c/o intermittent CP X 3 days, then in the evening 7/29/21 having constant chest pressure 7/10 radiating to bilateral shoulders a/w shortness of breath and subsequently BIBA to the ED, given ASA 325mg and SL Ntg by EMS with some symptom improvement, +NSTEMI with peak tnl 1.87, also found to be in new onset MALA treated with IV lopressor and initially converted to SR but intermittently in and out of AF, TTE revealed EF 20-25%, mod TR, mild PHTN, severe low flow/low gradient AS, 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 Now presents to the CCL for impella supported PCI to pLAD to be performed by Dr Acevedo.         Now s/p Impella supported [place] EMILEE 2.5 X 38mm to pLAD via RFA, procedure performed by  Dr. Acevedo, received Heparin for A/C with last  at 1659, Fentanyl and Versed IV intraprocedurally, arrived to recovery in NAD and HDS, Rpost CIE ECG with no acute changes, FA access site closed with perclose device, arrived with femstop in place for mild tract ooze, site stable, no bleed/hematoma, distal pulse + by doppler and no change from pre-procedure, transfer back ti in-pt unit once immediate recovery period completed and Femstop off without complication.             Plan:  -Formal cath report pending  -Post procedure management/monitoring per protocol  -Access site precautions  -Bedrest x 3 hours post Femstop removal (~2130)  -Labs and EKG in am  -Repeat ECG if any clinical indication or change on tele  -Continue current medical therapy  -Dual anti platelet therapy with aspirin/plavix   -Cont BB with Toprol 100mg po daily   -Cont statin therapy with Lipitor 80mg po qHS   -Restart low dose Entresto tomorrow if blood pressure tolerates and Cr stable  -Consider starting A/C for AFib tomorrow if CBC and RFA site stable  -Educated regarding strict adherence with DAPT   -Educated regarding post procedure management and care  -Discussed the importance of RF modification  -Cardiac rehab info provided/referral and communication to cardiac rehab completed  -F/U outpt in 1-2 weeks with primary cardiologist  -DISPO: Plan for D/C when optimized per primary team                                                                            Department of Cardiology                                                                  [facility]/[place] [address] [facility]                                                            Telephone: [phone]. Fax:[phone]                                                    Post- Procedure Note: Left Heart Cardiac Catheterization       Narrative:  88y  Male now s/p Impella supported [place] EMILEE 2.5 X 38mm to pLAD via RFA, procedure performed by  Dr. Acevedo, received Heparin for A/C with last  at 1659, Fentanyl and Versed IV intraprocedurally, arrived to recovery in NAD and HDS, post PCI ECG with no acute changes, FA access site closed with perclose device, arrived with femstop in place for mild tract ooze, site stable, no bleed/hematoma, distal pulse + by doppler and no change from pre-procedure, transfer back ti in-pt unit once immediate recovery period completed and Femstop off without complication.           PAST MEDICAL & SURGICAL HISTORY:  No pertinent past medical history    No significant past surgical history        No Known Allergies        Objective:  Vital Signs Last 24 Hrs  T(C): 36.4 (04 Aug 2021 15:31), Max: 36.8 (03 Aug 2021 22:12)  T(F): 97.5 (04 Aug 2021 15:31), Max: 98.3 (03 Aug 2021 22:12)  HR: 98 (04 Aug 2021 17:24) (69 - 98)  BP: 133/98 (04 Aug 2021 17:24) (113/71 - 138/87)  BP(mean): --  RR: 20 (04 Aug 2021 17:24) (18 - 20)  SpO2: 97% (04 Aug 2021 17:24) (95% - 99%)      PHYSICAL EXAM:  Constitutional: A & O x 3, NAD  HEENT:  Normal oral mucosa, PERRL, EOMI	  Cardiovascular: S1 S2, No murmurs, No JVD  Respiratory: Lungs clear to auscultation	  Gastrointestinal:  Soft, Non-tender, + BS	  Skin: No rashes or cyanosis  Neurologic: No deficit appreciated  Extremities: Normal range of motion, no edema  Vascular: Access site stable, no bleed or hematoma, distal pulses +doppler     Labs:                           16.4   11.64 )-----------( 206      ( 04 Aug 2021 05:27 )             50.8     08-04    141  |  103  |  27.6<H>  ----------------------------<  78  4.7   |  20.0<L>  |  1.15    Ca    9.1      04 Aug 2021 05:27            Assessment: 87 yo male with no PMHx c/o intermittent CP X 3 days, then in the evening 7/29/21 having constant chest pressure 7/10 radiating to bilateral shoulders a/w shortness of breath and subsequently BIBA to the ED, given ASA 325mg and SL Ntg by EMS with some symptom improvement, +NSTEMI with peak tnl 1.87, also found to be in new onset MALA treated with IV lopressor and initially converted to SR but intermittently in and out of AF, TTE revealed EF 20-25%, mod TR, mild PHTN, severe low flow/low gradient AS, 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 Now presents to the CCL for impella supported PCI to pLAD to be performed by Dr Acevedo.         Now s/p Impella supported [place] EMILEE 2.5 X 38mm to pLAD via RFA, procedure performed by  Dr. Acevedo, received Heparin for A/C with last  at 1659, Fentanyl and Versed IV intraprocedurally, arrived to recovery in NAD and HDS, post PCI ECG with no acute changes, FA access site closed with perclose device, arrived with femstop in place for mild tract ooze, site stable, no bleed/hematoma, distal pulse + by doppler and no change from pre-procedure, transfer back ti in-pt unit once immediate recovery period completed and Femstop off without complication.             Plan:  -Formal cath report pending  -Post procedure management/monitoring per protocol  -Access site precautions  -Bedrest x 3 hours post Femstop removal (~2130)  -Labs and EKG in am  -Repeat ECG if any clinical indication or change on tele  -Continue current medical therapy  -Dual anti platelet therapy with aspirin/plavix   -Cont BB with Toprol 100mg po daily   -Cont statin therapy with Lipitor 80mg po qHS   -Restart low dose Entresto tomorrow if blood pressure tolerates and Cr stable  -Consider starting A/C for AFib tomorrow if CBC and RFA site stable  -Educated regarding strict adherence with DAPT   -Educated regarding post procedure management and care  -Discussed the importance of RF modification  -Cardiac rehab info provided/referral and communication to cardiac rehab completed  -F/U outpt in 1-2 weeks with primary cardiologist  -DISPO: Plan for D/C when optimized per primary team

## 2021-08-04 NOTE — PROGRESS NOTE ADULT - SUBJECTIVE AND OBJECTIVE BOX
[facility] Division of Hospital Medicine  Miguel A Reaves [phone]    Chief Complaint:  Patient is a 88y old  Male who presents with a chief complaint of new onset Afib w RVR  NSTEMI (03 Aug 2021 16:28)      SUBJECTIVE / OVERNIGHT EVENTS:  Patient seen and examined at bedside. No acute events reported overnight. No new complaints. [facility] today.    Patient denies chest pain, SOB, abd pain, N/V, fever, chills, dysuria or any other complaints. All remainder ROS negative.     MEDICATIONS  (STANDING):  aspirin  chewable 81 milliGRAM(s) Oral daily  atorvastatin 80 milliGRAM(s) Oral at bedtime  clopidogrel Tablet 75 milliGRAM(s) Oral daily  furosemide    Tablet 40 milliGRAM(s) Oral daily  metoprolol succinate  milliGRAM(s) Oral daily  polyethylene glycol 3350 17 Gram(s) Oral daily  senna 2 Tablet(s) Oral at bedtime    MEDICATIONS  (PRN):  acetaminophen   Tablet .. 650 milliGRAM(s) Oral every 6 hours PRN Temp greater or equal to 38.5C (101.3F), Mild Pain (1 - 3)  aluminum hydroxide/magnesium hydroxide/simethicone Suspension 30 milliLiter(s) Oral every 4 hours PRN Dyspepsia  melatonin 3 milliGRAM(s) Oral at bedtime PRN Insomnia  ondansetron Injectable 4 milliGRAM(s) IV Push every 8 hours PRN Nausea and/or Vomiting        I&O's Summary    03 Aug 2021 07:01  -  04 Aug 2021 07:00  --------------------------------------------------------  IN: 0 mL / OUT: 150 mL / NET: -150 mL    04 Aug 2021 07:01  -  04 Aug 2021 10:17  --------------------------------------------------------  IN: 0 mL / OUT: 300 mL / NET: -300 mL        PHYSICAL EXAM:  Vital Signs Last 24 Hrs  T(C): 36.3 (04 Aug 2021 08:26), Max: 36.8 (03 Aug 2021 22:12)  T(F): 97.4 (04 Aug 2021 08:26), Max: 98.3 (03 Aug 2021 22:12)  HR: 83 (04 Aug 2021 08:26) (69 - 92)  BP: 113/71 (04 Aug 2021 08:26) (104/58 - 117/93)  BP(mean): --  RR: 18 (04 Aug 2021 08:26) (17 - 18)  SpO2: 95% (04 Aug 2021 08:26) (95% - 98%)        CONSTITUTIONAL: NAD  HEENT: NC/AT, PERRL, no JVD  RESPIRATORY: CTA bilaterally, normal effort  CARDIOVASCULAR: RRR, S1/S2+, no m/g/r  ABDOMEN: Nontender to palpation, normoactive bowel sounds, no rebound/guarding; No hepatosplenomegaly  MUSCULOSKELETAL: No edema, cyanosis or deformities.  PSYCH: A+O to person, place, and time; affect appropriate  NEUROLOGY: CN 2-12 are intact and symmetric; no gross neurological deficits.  SKIN: No rashes; no palpable lesions  VASC: Distal pulses palpable    LABS:                        16.4   11.64 )-----------( 206      ( 04 Aug 2021 05:27 )             50.8             CARDIAC MARKERS ( 04 Aug 2021 05:27 )  x     / 1.35 ng/mL / x     / x     / x              CAPILLARY BLOOD GLUCOSE            RADIOLOGY & ADDITIONAL TESTS:  Results Reviewed:   Imaging Personally Reviewed:  Electrocardiogram Personally Reviewed:

## 2021-08-04 NOTE — PROGRESS NOTE ADULT - ATTENDING COMMENTS
-patient stable for high risk PCI with impella assistance today, check Cr.   -Afib overnight better rate controlled, given underlying severe cardiomyopathy will trial attempt for cardioversion, plan for PIO/DCCV tomorrow if remains stable, and we can better confirm severity of AS with direct planimetry and degree of aortic calcification, may need dobutamine stress Echo depends on PIO result  -resume anticoagulation for Afib post-PCI if stable  -resume Entresto in 24hrs if Cr. stable, continue metoprolol succinate  -discussed with daughter yesterday over the phone and with his son at bedside.       Jose De Jesus Beth DO, [facility]  Faculty Non-Invasive Cardiologist  [phone] -patient underwent high risk PCI to LAD with impella support, vitals was tenuous and had intraprocedure VT.   -Afib overnight better rate controlled, given underlying severe cardiomyopathy and severe CAD s/p PCI would defer PIO/CV for now to avoid hypotension, continue rate control.   -low gradient AS would defer dobutamine stress Echo inpatient given CAD s/p recentint stenting, would consider elective outpatient workup.   -resume anticoagulation for Afib post-PCI tomorrow if groin site stable  -resume Entresto in 24hrs if Cr. stable, continue metoprolol succinate  -discussed with daughter over the phone and with his son at bedside.       Jose De Jesus Beth DO, [facility]  Faculty Non-Invasive Cardiologist  [phone]

## 2021-08-04 NOTE — PROGRESS NOTE ADULT - ASSESSMENT
87 y/o male with new onset Afib w/ RVR, NSTEMI.     #CAD s/p LHC, New onset systolic CHF  - Cardio following  - C/w ASA, Plavix, statin, BB, Entresto  - Heparin gtt switched to Lovenox  - Lasix 40mg IV BID switched to Lasix 40mg PO qd  - ECHO reviewed  - C/w telemetry  - Plan is for PCI to the LAD and Diag with Impella Support today    #AFib w/w RVR  - C/w Metoprolol  - ECHO reviewed  - Lovenox for elevated CHADsVASC, dose held last night  - Plan per Cardiology    #PAD?  - Lower extremities warm  - Arterial duplex reviewed    DVT ppx: Lovenox    Discussed w/ daughter at bedside.

## 2021-08-05 ENCOUNTER — TRANSCRIPTION ENCOUNTER (OUTPATIENT)
Age: 86
End: 2021-08-05

## 2021-08-05 PROBLEM — Z00.00 ENCOUNTER FOR PREVENTIVE HEALTH EXAMINATION: Status: ACTIVE | Noted: 2021-08-05

## 2021-08-05 LAB
ABO RH CONFIRMATION: SIGNIFICANT CHANGE UP
ALBUMIN SERPL ELPH-MCNC: 3.5 G/DL — SIGNIFICANT CHANGE UP (ref 3.3–5.2)
ALP SERPL-CCNC: 93 U/L — SIGNIFICANT CHANGE UP (ref 40–120)
ALT FLD-CCNC: 49 U/L — HIGH
ANION GAP SERPL CALC-SCNC: 14 MMOL/L — SIGNIFICANT CHANGE UP (ref 5–17)
AST SERPL-CCNC: 50 U/L — HIGH
BILIRUB SERPL-MCNC: 1.5 MG/DL — SIGNIFICANT CHANGE UP (ref 0.4–2)
BUN SERPL-MCNC: 30.4 MG/DL — HIGH (ref 8–20)
CALCIUM SERPL-MCNC: 8.8 MG/DL — SIGNIFICANT CHANGE UP (ref 8.6–10.2)
CHLORIDE SERPL-SCNC: 102 MMOL/L — SIGNIFICANT CHANGE UP (ref 98–107)
CO2 SERPL-SCNC: 25 MMOL/L — SIGNIFICANT CHANGE UP (ref 22–29)
CREAT SERPL-MCNC: 1.16 MG/DL — SIGNIFICANT CHANGE UP (ref 0.5–1.3)
GLUCOSE SERPL-MCNC: 83 MG/DL — SIGNIFICANT CHANGE UP (ref 70–99)
HCT VFR BLD CALC: 48.5 % — SIGNIFICANT CHANGE UP (ref 39–50)
HGB BLD-MCNC: 16.1 G/DL — SIGNIFICANT CHANGE UP (ref 13–17)
MAGNESIUM SERPL-MCNC: 2.2 MG/DL — SIGNIFICANT CHANGE UP (ref 1.6–2.6)
MCHC RBC-ENTMCNC: 29.7 PG — SIGNIFICANT CHANGE UP (ref 27–34)
MCHC RBC-ENTMCNC: 33.2 GM/DL — SIGNIFICANT CHANGE UP (ref 32–36)
MCV RBC AUTO: 89.5 FL — SIGNIFICANT CHANGE UP (ref 80–100)
PLATELET # BLD AUTO: 196 K/UL — SIGNIFICANT CHANGE UP (ref 150–400)
POTASSIUM SERPL-MCNC: 4.2 MMOL/L — SIGNIFICANT CHANGE UP (ref 3.5–5.3)
POTASSIUM SERPL-SCNC: 4.2 MMOL/L — SIGNIFICANT CHANGE UP (ref 3.5–5.3)
PROT SERPL-MCNC: 6 G/DL — LOW (ref 6.6–8.7)
RBC # BLD: 5.42 M/UL — SIGNIFICANT CHANGE UP (ref 4.2–5.8)
RBC # FLD: 14.7 % — HIGH (ref 10.3–14.5)
SODIUM SERPL-SCNC: 141 MMOL/L — SIGNIFICANT CHANGE UP (ref 135–145)
WBC # BLD: 10.58 K/UL — HIGH (ref 3.8–10.5)
WBC # FLD AUTO: 10.58 K/UL — HIGH (ref 3.8–10.5)

## 2021-08-05 PROCEDURE — 99232 SBSQ HOSP IP/OBS MODERATE 35: CPT

## 2021-08-05 PROCEDURE — 93010 ELECTROCARDIOGRAM REPORT: CPT

## 2021-08-05 PROCEDURE — 99233 SBSQ HOSP IP/OBS HIGH 50: CPT

## 2021-08-05 RX ORDER — POLYETHYLENE GLYCOL 3350 17 G/17G
17 POWDER, FOR SOLUTION ORAL
Qty: 510 | Refills: 0
Start: 2021-08-05 | End: 2021-09-03

## 2021-08-05 RX ORDER — SENNA PLUS 8.6 MG/1
2 TABLET ORAL
Qty: 60 | Refills: 0
Start: 2021-08-05 | End: 2021-09-03

## 2021-08-05 RX ORDER — SACUBITRIL AND VALSARTAN 24; 26 MG/1; MG/1
1 TABLET, FILM COATED ORAL
Refills: 0 | Status: DISCONTINUED | OUTPATIENT
Start: 2021-08-05 | End: 2021-08-06

## 2021-08-05 RX ORDER — METOPROLOL TARTRATE 50 MG
1 TABLET ORAL
Qty: 30 | Refills: 0
Start: 2021-08-05 | End: 2021-09-03

## 2021-08-05 RX ORDER — APIXABAN 2.5 MG/1
5 TABLET, FILM COATED ORAL EVERY 12 HOURS
Refills: 0 | Status: DISCONTINUED | OUTPATIENT
Start: 2021-08-05 | End: 2021-08-06

## 2021-08-05 RX ORDER — SACUBITRIL AND VALSARTAN 24; 26 MG/1; MG/1
1 TABLET, FILM COATED ORAL
Qty: 60 | Refills: 0
Start: 2021-08-05 | End: 2021-09-03

## 2021-08-05 RX ORDER — CLOPIDOGREL BISULFATE 75 MG/1
1 TABLET, FILM COATED ORAL
Qty: 30 | Refills: 0
Start: 2021-08-05 | End: 2021-09-03

## 2021-08-05 RX ORDER — ATORVASTATIN CALCIUM 80 MG/1
1 TABLET, FILM COATED ORAL
Qty: 30 | Refills: 0
Start: 2021-08-05 | End: 2021-09-03

## 2021-08-05 RX ORDER — FUROSEMIDE 40 MG
1 TABLET ORAL
Qty: 30 | Refills: 0
Start: 2021-08-05 | End: 2021-09-03

## 2021-08-05 RX ORDER — ASPIRIN/CALCIUM CARB/MAGNESIUM 324 MG
1 TABLET ORAL
Qty: 30 | Refills: 0
Start: 2021-08-05 | End: 2021-09-03

## 2021-08-05 RX ORDER — APIXABAN 2.5 MG/1
1 TABLET, FILM COATED ORAL
Qty: 60 | Refills: 0
Start: 2021-08-05 | End: 2021-09-03

## 2021-08-05 RX ADMIN — Medication 81 MILLIGRAM(S): at 09:38

## 2021-08-05 RX ADMIN — APIXABAN 5 MILLIGRAM(S): 2.5 TABLET, FILM COATED ORAL at 21:25

## 2021-08-05 RX ADMIN — APIXABAN 5 MILLIGRAM(S): 2.5 TABLET, FILM COATED ORAL at 09:39

## 2021-08-05 RX ADMIN — ATORVASTATIN CALCIUM 80 MILLIGRAM(S): 80 TABLET, FILM COATED ORAL at 21:25

## 2021-08-05 RX ADMIN — SACUBITRIL AND VALSARTAN 1 TABLET(S): 24; 26 TABLET, FILM COATED ORAL at 17:19

## 2021-08-05 RX ADMIN — Medication 3 MILLIGRAM(S): at 21:25

## 2021-08-05 RX ADMIN — SENNA PLUS 2 TABLET(S): 8.6 TABLET ORAL at 21:25

## 2021-08-05 RX ADMIN — CLOPIDOGREL BISULFATE 75 MILLIGRAM(S): 75 TABLET, FILM COATED ORAL at 09:38

## 2021-08-05 RX ADMIN — Medication 40 MILLIGRAM(S): at 05:35

## 2021-08-05 RX ADMIN — Medication 100 MILLIGRAM(S): at 05:35

## 2021-08-05 NOTE — DISCHARGE NOTE PROVIDER - NSDCCPCAREPLAN_GEN_ALL_CORE_FT
PRINCIPAL DISCHARGE DIAGNOSIS  Diagnosis: NSTEMI (non-ST elevated myocardial infarction)  Assessment and Plan of Treatment: Continue current medications as prescribed.  Follow up with primary doctor and cardiology.      SECONDARY DISCHARGE DIAGNOSES  Diagnosis: Ischemic cardiomyopathy  Assessment and Plan of Treatment: Continue current medications as prescribed.  Follow up with primary doctor and cardiology.    Diagnosis: Atrial fibrillation with RVR  Assessment and Plan of Treatment: Continue current medications as prescribed.  Follow up with primary doctor and cardiology.

## 2021-08-05 NOTE — PROGRESS NOTE ADULT - SUBJECTIVE AND OBJECTIVE BOX
[facility] Division of Hospital Medicine  Miguel A Reaves [phone]    Chief Complaint:  Patient is a 88y old  Male who presents with a chief complaint of new onset Afib w RVR  NSTEMI (05 Aug 2021 10:18)      SUBJECTIVE / OVERNIGHT EVENTS:    Patient denies chest pain, SOB, abd pain, N/V, fever, chills, dysuria or any other complaints. All remainder ROS negative.     MEDICATIONS  (STANDING):  apixaban 5 milliGRAM(s) Oral every 12 hours  aspirin  chewable 81 milliGRAM(s) Oral daily  atorvastatin 80 milliGRAM(s) Oral at bedtime  clopidogrel Tablet 75 milliGRAM(s) Oral daily  furosemide    Tablet 40 milliGRAM(s) Oral daily  metoprolol succinate  milliGRAM(s) Oral daily  polyethylene glycol 3350 17 Gram(s) Oral daily  sacubitril 24 mG/valsartan 26 mG 1 Tablet(s) Oral two times a day  senna 2 Tablet(s) Oral at bedtime    MEDICATIONS  (PRN):  acetaminophen   Tablet .. 650 milliGRAM(s) Oral every 6 hours PRN Temp greater or equal to 38.5C (101.3F), Mild Pain (1 - 3)  aluminum hydroxide/magnesium hydroxide/simethicone Suspension 30 milliLiter(s) Oral every 4 hours PRN Dyspepsia  melatonin 3 milliGRAM(s) Oral at bedtime PRN Insomnia  ondansetron Injectable 4 milliGRAM(s) IV Push every 8 hours PRN Nausea and/or Vomiting        I&O's Summary      PHYSICAL EXAM:  Vital Signs Last 24 Hrs  T(C): 36.7 (06 Aug 2021 05:54), Max: 37.1 (05 Aug 2021 23:05)  T(F): 98 (06 Aug 2021 05:54), Max: 98.7 (05 Aug 2021 23:05)  HR: 73 (06 Aug 2021 05:54) (66 - 80)  BP: 97/66 (06 Aug 2021 05:54) (97/59 - 118/70)  BP(mean): --  RR: 16 (06 Aug 2021 05:54) (16 - 17)  SpO2: 98% (06 Aug 2021 05:54) (95% - 98%)        CONSTITUTIONAL: NAD  HEENT: NC/AT, PERRL, no JVD  RESPIRATORY: CTA bilaterally, normal effort  CARDIOVASCULAR: RRR, S1/S2+, no m/g/r  ABDOMEN: Nontender to palpation, normoactive bowel sounds, no rebound/guarding; No hepatosplenomegaly  MUSCULOSKELETAL: No edema, cyanosis or deformities.  PSYCH: A+O to person, place, and time; affect appropriate  NEUROLOGY: CN 2-12 are intact and symmetric; no gross neurological deficits.  SKIN: No rashes; no palpable lesions  VASC: Distal pulses palpable    LABS:                        15.1   10.53 )-----------( 189      ( 06 Aug 2021 05:16 )             45.6     08-05    141  |  102  |  30.4<H>  ----------------------------<  83  4.2   |  25.0  |  1.16    Ca    8.8      05 Aug 2021 08:17  Mg     2.2     08-05    TPro  6.0<L>  /  Alb  3.5  /  TBili  1.5  /  DBili  x   /  AST  50<H>  /  ALT  49<H>  /  AlkPhos  93  08-05              CAPILLARY BLOOD GLUCOSE            RADIOLOGY & ADDITIONAL TESTS:  Results Reviewed:   Imaging Personally Reviewed:  Electrocardiogram Personally Reviewed:

## 2021-08-05 NOTE — DISCHARGE NOTE PROVIDER - NSDCMRMEDTOKEN_GEN_ALL_CORE_FT
apixaban 5 mg oral tablet: 1 tab(s) orally every 12 hours  aspirin 81 mg oral tablet, chewable: 1 tab(s) orally once a day  atorvastatin 80 mg oral tablet: 1 tab(s) orally once a day (at bedtime)  clopidogrel 75 mg oral tablet: 1 tab(s) orally once a day  furosemide 40 mg oral tablet: 1 tab(s) orally once a day  metoprolol succinate 100 mg oral tablet, extended release: 1 tab(s) orally once a day  polyethylene glycol 3350 oral powder for reconstitution: 17 gram(s) orally once a day  sacubitril-valsartan 24 mg-26 mg oral tablet: 1 tab(s) orally 2 times a day  senna oral tablet: 2 tab(s) orally once a day (at bedtime)

## 2021-08-05 NOTE — PROGRESS NOTE ADULT - SUBJECTIVE AND OBJECTIVE BOX
[facility]  [facility]  [address], [place] [address]    REASON FOR VISIT:  Follow up on cad  UPDATE:  s.p impella supported EMILEE to p LAD via right RFA  Feeling well  No chest pain  Groin is stable  TELEMETRY MONITORING:  Atrial fib with controlled jesus response  EKG:  Atrial fib at 78 PRWP v1-v4 qs 3 and avf unchanged ekg    CARDIAC MARKERS ( 04 Aug 2021 05:27 )  x     / 1.35 ng/mL / x     / x     / x        Labs pending    MEDICATIONS  (STANDING):  aspirin  chewable 81 milliGRAM(s) Oral daily  atorvastatin 80 milliGRAM(s) Oral at bedtime  clopidogrel Tablet 75 milliGRAM(s) Oral daily  furosemide    Tablet 40 milliGRAM(s) Oral daily  metoprolol succinate  milliGRAM(s) Oral daily  polyethylene glycol 3350 17 Gram(s) Oral daily  senna 2 Tablet(s) Oral at bedtime    ROS:    No fever chills  Cardiac  No cp sob or palp  Resp  no cough no mucus production  Gi  no abd pain no melana  Ext No calf tenderness, no edema    Vital Signs Last 24 Hrs  T(C): 36.8 (05 Aug 2021 04:18), Max: 36.8 (05 Aug 2021 04:18)  T(F): 98.3 (05 Aug 2021 04:18), Max: 98.3 (05 Aug 2021 04:18)  HR: 96 (05 Aug 2021 04:18) (77 - 119)  BP: 129/84 (05 Aug 2021 04:18) (129/84 - 139/98)  BP(mean): --  RR: 18 (05 Aug 2021 04:18) (18 - 20)  SpO2: 95% (05 Aug 2021 04:18) (91% - 99%)  T(C): 36.8 (08-05-21 @ 04:18), Max: 36.8 (08-05-21 @ 04:18)  HR: 96 (08-05-21 @ 04:18) (77 - 119)  BP: 129/84 (08-05-21 @ 04:18) (129/84 - 139/98)  RR: 18 (08-05-21 @ 04:18) (18 - 20)  SpO2: 95% (08-05-21 @ 04:18) (91% - 99%)    HEENT Head atraumatic eomi, oral mucosa moist  Neck No jvd  CV S1&S2   2/6 systolic murmur lsb  RESP  clear lung fields  GI  Soft active bowel sounds non tender  EXT  No clubbing/Cyanosis /No edema  right groin soft no hematoma  NEURO  Alert oriented  No gross motor or sensory deficits    < from: TTE Echo Complete w/o Contrast w/ Doppler (07.30.21 @ 08:09) >    Summary:   1. Endocardial visualization was enhanced with intravenous echo contrast.   2. Left ventricular ejection fraction, by visual estimation, is 20 to 25%.   3. Severely decreased global left ventricular systolic function.   4. Multiple left ventricular regional wall motion abnormalities exist. See wall motion findings.   5. Severely enlarged left atrium.   6. Severely enlarged right atrium.   7. Mild mitral annular calcification.   8. Mild mitral valve regurgitation.   9. Moderate tricuspid regurgitation.  10. Peak transaortic gradient equals 14.0 mmHg, mean transaortic gradient equals 8.6 mmHg, the calculated aortic valve area equals 0.59 cm² by the continuity equation consistent with severe aortic stenosis. The Dimensionless Index value is 0.23, also consistent with severe AS.  11. Estimated pulmonary artery systolic pressure is 49.5 mmHg assuming a right atrial pressure of 8 mmHg, which is consistent with mild pulmonary hypertension.  12. Trivial pericardial effusion.  13. No prior studies are available for comparison.      < end of copied text >

## 2021-08-05 NOTE — PROGRESS NOTE ADULT - ASSESSMENT
87 y/o male with new onset Afib w/ RVR, NSTEMI.     #CAD s/p LHC, New onset systolic CHF  - Cardio following  - C/w ASA, Plavix, statin, BB, Entresto  - Heparin gtt switched to Lovenox  - Lasix 40mg IV BID switched to Lasix 40mg PO qd  - ECHO reviewed  - C/w telemetry  - S/p PCI to LAD    #AFib w/w RVR  - C/w Metoprolol  - ECHO reviewed  - Resume Eliquis  - Plan per Cardiology    #PAD?  - Lower extremities warm  - Arterial duplex reviewed    DVT ppx: Lovenox    Potential d/c today

## 2021-08-05 NOTE — DISCHARGE NOTE PROVIDER - HOSPITAL COURSE
87 y/o male admitted with new onset Afib w/ RVR, NSTEMI. Cardiology following.  Patient s/p [facility] with significant triple vessel coronary artery disease - Prox LAD=99% (REYNALDO 2), Ostial Diag=90%, Prox LPL=75%, Prox to Mid RCA=75%, Left ventricular function is abnormal, LVEF=30%, new onset systolic HF.  Patient started on ASA, Plavix, statin, BB, Entresto.  Patient initially on Heparin gtt and changed to Lovenox.  TTE showed EF 20-25%, severe AS.  Patient treated with IV Lasix.  Patient underwent PCI to LAD with Impella support 8/4/21.  Patient had arterial duplex for LE coolness with no PAD noted.  Patient transitioned to Eliquis and PO Lasix.  Patient stable for discharge to home today with outpatient follow up with cardiology and primary doctor.      Vital Signs Last 24 Hrs  T(C): 36.7 (05 Aug 2021 09:35), Max: 36.8 (05 Aug 2021 04:18)  T(F): 98 (05 Aug 2021 09:35), Max: 98.3 (05 Aug 2021 04:18)  HR: 80 (05 Aug 2021 09:35) (77 - 119)  BP: 97/59 (05 Aug 2021 09:35) (97/59 - 139/98)  BP(mean): --  RR: 17 (05 Aug 2021 09:35) (17 - 20)  SpO2: 95% (05 Aug 2021 04:18) (91% - 99%)    PHYSICAL EXAM:  GENERAL: NAD  HEAD:  Atraumatic, Normocephalic  NECK: Supple, No JVD, Normal thyroid  NERVOUS SYSTEM:  Alert & Oriented X3, Good concentration; Motor Strength 5/5 B/L upper and lower extremities  CHEST/LUNG: Clear to auscultation bilaterally  HEART: Regular rate and rhythm; No murmurs, rubs, or gallops  ABDOMEN: Soft, Nontender, Nondistended; Bowel sounds present  EXTREMITIES:  2+ Peripheral Pulses, No clubbing, cyanosis, or edema; Right femoral site with no bleeding noted       87 y/o male admitted with new onset Afib w/ RVR, NSTEMI. Cardiology following.  Patient s/p [facility] with significant triple vessel coronary artery disease - Prox LAD=99% (REYNALDO 2), Ostial Diag=90%, Prox LPL=75%, Prox to Mid RCA=75%, Left ventricular function is abnormal, LVEF=30%, new onset systolic HF.  Patient started on ASA, Plavix, statin, BB, Entresto.  Patient initially on Heparin gtt and changed to Lovenox.  TTE showed EF 20-25%, severe AS.  Patient treated with IV Lasix.  Patient underwent PCI to LAD with Impella support 8/4/21.  Patient had arterial duplex for LE coolness with no PAD noted.  Patient transitioned to Eliquis and PO Lasix.  Patient stable for discharge to home today with outpatient follow up with cardiology and primary doctor.      Vital Signs Last 24 Hrs  T(C): 36.7 (05 Aug 2021 09:35), Max: 36.8 (05 Aug 2021 04:18)  T(F): 98 (05 Aug 2021 09:35), Max: 98.3 (05 Aug 2021 04:18)  HR: 80 (05 Aug 2021 09:35) (77 - 119)  BP: 97/59 (05 Aug 2021 09:35) (97/59 - 139/98)  BP(mean): --  RR: 17 (05 Aug 2021 09:35) (17 - 20)  SpO2: 95% (05 Aug 2021 04:18) (91% - 99%)    PHYSICAL EXAM:  GENERAL: NAD  HEAD:  Atraumatic, Normocephalic  NECK: Supple, No JVD, Normal thyroid  NERVOUS SYSTEM:  Alert & Oriented X3, Good concentration; Motor Strength 5/5 B/L upper and lower extremities  CHEST/LUNG: Clear to auscultation bilaterally  HEART: Regular rate and rhythm; No murmurs, rubs, or gallops  ABDOMEN: Soft, Nontender, Nondistended; Bowel sounds present  EXTREMITIES:  2+ Peripheral Pulses, No clubbing, cyanosis, or edema; Right femoral site with no bleeding noted    I, the attending physician, was physically present for the key portions of the evaluation and management service provided for this discharge.  The time spent reviewing the hospital course, labs/imaging, assessing/counseling the patient, coordinating with consultants/social work/nursing staff was  39 minutes.

## 2021-08-05 NOTE — PROGRESS NOTE ADULT - TIME BILLING
NSTEMI/CAD/ICM, Acute HFrEF, Afib
NSTEMI/CAD, ICM, Acute HFrEF, Afib, AS
Acute HFrEF/ICM/CAD, Afib, AS

## 2021-08-05 NOTE — DISCHARGE NOTE PROVIDER - CARE PROVIDER_API CALL
Primary doctor,   Phone: (   )    -  Fax: (   )    -  Follow Up Time:     Andres Varma)  Cardiovascular Disease  [address] [facility], [place] [address]  Phone: [phone]  Fax: [phone]  Follow Up Time:

## 2021-08-05 NOTE — PROGRESS NOTE ADULT - ATTENDING COMMENTS
-underwent high risk impella-assisted PCI yesterday stenting to mLAD, medical management for RCA and LPL1 disease for now, denies complains and R-groin no hematoma or tenderness  -lack of prescription drug coverage not able to afford Entresto and Eliquis- clarify with social work if can apply, otherwise would need warfarin dosing for Afib, switch Entresto to losartan 50mg  -continue metoprolol succinate 100mg for Afib, CAD and HFrEF  -continue ASA/clopidogrel and statin  -outpatient workup for low gradient severe AS and consider elective PIO/CV for Afib  -stable from cardiac standpoint for discharge planning home, will arrange follow up in the office within 1-2 weeks.       Jose De Jesus Beth DO, [facility]  Faculty Non-Invasive Cardiologist  [phone]

## 2021-08-05 NOTE — DISCHARGE NOTE PROVIDER - NSDCFUADDAPPT_GEN_ALL_CORE_FT
[facility]:    1. CARDIO APPOITNMENT    / Marleni VERA  On MONDAY August 16, 2021 at 330pm  [address] [facility] [address], [place] [address]  Tel [phone]     2. Pt agreeable to MEDS-TO-BED with [facility]

## 2021-08-05 NOTE — PROGRESS NOTE ADULT - ASSESSMENT
89 yo male with no regular health care who presented to ER with c/o intermittent CP X 3 days, then in the evening 7/29/21 having constant chest pressure 7/10 radiating to bilateral shoulders a/w shortness of breath and subsequently BIBA to the ED, given ASA 325mg and SL Ntg by EMS with some symptom improvement, +NSTEMI with peak tnl 1.87, also found to be in new onset MALA treated with IV lopressor and converted to SR, TTE revealed EF 20-25%, mod TR, mild PHTN, severe low flow/low gradient AS, 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 Now presents to the CCL for Impella supported PCI to pLAD to be  Having Paf    CAD s/p impella supported Brodie to proximal LAD s/p Nstemi with peak trop to 1.87 trending downward  patient did well  Discussed dual antiplatelet agent  High intensity statin  Groin stable  oob ambulating  Cardiac rehab discussed    ISCHEMIC CARDIOMYOPATHY  S/P stent to LAD  Asa, plavix and lipitor  Euvolemic on exam  -> po lasix  Low na diet discussed  Bnp 9840  start entresto pending labs from today    ATRIAL FIB  Cardioversion entertained but decision was made to rate control  given underlying severe cardiomyopathy and severe CAD  to prevent hypotensive episodes  Hgb ok start eliquis 5mg bid    LOW GRADIENT AS  Out patient work up  hold on dobutamine echo to further assess AS    CARDIAC MEDS  aspirin  chewable 81 milliGRAM(s) Oral daily  atorvastatin 80 milliGRAM(s) Oral at bedtime  clopidogrel Tablet 75 milliGRAM(s) Oral daily  furosemide    Tablet 40 milliGRAM(s) Oral daily  metoprolol succinate  milliGRAM(s) Oral daily

## 2021-08-06 VITALS — WEIGHT: 156.53 LBS

## 2021-08-06 LAB
HCT VFR BLD CALC: 45.6 % — SIGNIFICANT CHANGE UP (ref 39–50)
HGB BLD-MCNC: 15.1 G/DL — SIGNIFICANT CHANGE UP (ref 13–17)
MCHC RBC-ENTMCNC: 29.5 PG — SIGNIFICANT CHANGE UP (ref 27–34)
MCHC RBC-ENTMCNC: 33.1 GM/DL — SIGNIFICANT CHANGE UP (ref 32–36)
MCV RBC AUTO: 89.1 FL — SIGNIFICANT CHANGE UP (ref 80–100)
PLATELET # BLD AUTO: 189 K/UL — SIGNIFICANT CHANGE UP (ref 150–400)
RBC # BLD: 5.12 M/UL — SIGNIFICANT CHANGE UP (ref 4.2–5.8)
RBC # FLD: 14.5 % — SIGNIFICANT CHANGE UP (ref 10.3–14.5)
WBC # BLD: 10.53 K/UL — HIGH (ref 3.8–10.5)
WBC # FLD AUTO: 10.53 K/UL — HIGH (ref 3.8–10.5)

## 2021-08-06 PROCEDURE — C1889: CPT

## 2021-08-06 PROCEDURE — C1887: CPT

## 2021-08-06 PROCEDURE — 86769 SARS-COV-2 COVID-19 ANTIBODY: CPT

## 2021-08-06 PROCEDURE — 85025 COMPLETE CBC W/AUTO DIFF WBC: CPT

## 2021-08-06 PROCEDURE — 83735 ASSAY OF MAGNESIUM: CPT

## 2021-08-06 PROCEDURE — 86901 BLOOD TYPING SEROLOGIC RH(D): CPT

## 2021-08-06 PROCEDURE — 84100 ASSAY OF PHOSPHORUS: CPT

## 2021-08-06 PROCEDURE — 97163 PT EVAL HIGH COMPLEX 45 MIN: CPT

## 2021-08-06 PROCEDURE — 0225U NFCT DS DNA&RNA 21 SARSCOV2: CPT

## 2021-08-06 PROCEDURE — 33990 INSJ PERQ VAD L HRT ARTERIAL: CPT

## 2021-08-06 PROCEDURE — 86850 RBC ANTIBODY SCREEN: CPT

## 2021-08-06 PROCEDURE — 99239 HOSP IP/OBS DSCHRG MGMT >30: CPT

## 2021-08-06 PROCEDURE — 99153 MOD SED SAME PHYS/QHP EA: CPT

## 2021-08-06 PROCEDURE — 36415 COLL VENOUS BLD VENIPUNCTURE: CPT

## 2021-08-06 PROCEDURE — 99152 MOD SED SAME PHYS/QHP 5/>YRS: CPT

## 2021-08-06 PROCEDURE — 80053 COMPREHEN METABOLIC PANEL: CPT

## 2021-08-06 PROCEDURE — 86900 BLOOD TYPING SEROLOGIC ABO: CPT

## 2021-08-06 PROCEDURE — 99232 SBSQ HOSP IP/OBS MODERATE 35: CPT

## 2021-08-06 PROCEDURE — G0278: CPT

## 2021-08-06 PROCEDURE — 93306 TTE W/DOPPLER COMPLETE: CPT

## 2021-08-06 PROCEDURE — C1874: CPT

## 2021-08-06 PROCEDURE — C1769: CPT

## 2021-08-06 PROCEDURE — 84484 ASSAY OF TROPONIN QUANT: CPT

## 2021-08-06 PROCEDURE — 85730 THROMBOPLASTIN TIME PARTIAL: CPT

## 2021-08-06 PROCEDURE — 83880 ASSAY OF NATRIURETIC PEPTIDE: CPT

## 2021-08-06 PROCEDURE — 71045 X-RAY EXAM CHEST 1 VIEW: CPT

## 2021-08-06 PROCEDURE — C1760: CPT

## 2021-08-06 PROCEDURE — 93005 ELECTROCARDIOGRAM TRACING: CPT

## 2021-08-06 PROCEDURE — 87635 SARS-COV-2 COVID-19 AMP PRB: CPT

## 2021-08-06 PROCEDURE — C1894: CPT

## 2021-08-06 PROCEDURE — 93926 LOWER EXTREMITY STUDY: CPT

## 2021-08-06 PROCEDURE — 85610 PROTHROMBIN TIME: CPT

## 2021-08-06 PROCEDURE — 80048 BASIC METABOLIC PNL TOTAL CA: CPT

## 2021-08-06 PROCEDURE — 99285 EMERGENCY DEPT VISIT HI MDM: CPT

## 2021-08-06 PROCEDURE — 93458 L HRT ARTERY/VENTRICLE ANGIO: CPT

## 2021-08-06 PROCEDURE — 83036 HEMOGLOBIN GLYCOSYLATED A1C: CPT

## 2021-08-06 PROCEDURE — C1725: CPT

## 2021-08-06 PROCEDURE — 80061 LIPID PANEL: CPT

## 2021-08-06 PROCEDURE — C9600: CPT | Mod: LD

## 2021-08-06 PROCEDURE — 85027 COMPLETE CBC AUTOMATED: CPT

## 2021-08-06 PROCEDURE — 84443 ASSAY THYROID STIM HORMONE: CPT

## 2021-08-06 RX ADMIN — Medication 81 MILLIGRAM(S): at 08:08

## 2021-08-06 RX ADMIN — APIXABAN 5 MILLIGRAM(S): 2.5 TABLET, FILM COATED ORAL at 05:56

## 2021-08-06 RX ADMIN — CLOPIDOGREL BISULFATE 75 MILLIGRAM(S): 75 TABLET, FILM COATED ORAL at 08:08

## 2021-08-06 RX ADMIN — Medication 40 MILLIGRAM(S): at 05:56

## 2021-08-06 NOTE — PROGRESS NOTE ADULT - ASSESSMENT
88M no prior medical care >15 yrs presented to ER with c/o intermittent CP X 3 days, then in the evening 7/29/21 having constant chest pressure 7/10 radiating to bilateral shoulders a/w shortness of breath and subsequently BIBA to the ED, given ASA 325mg and SL Ntg by EMS with some symptom improvement, +NSTEMI with peak tnl 1.87, also found to be in new onset MALA treated with IV lopressor and converted to SR, TTE revealed EF 20-25%, mod TR, mild PHTN, severe low flow/low gradient AS, cath on 7/30/21 with 3V CAD, 99% thrombotic culprit lesion pLAD and D1 90% disease, also with 75% lesion LPL and mRCA, underwent high risk PCI with Impella support to pLAD.    1. CAD s/p impella support EMILEE to aojidriu-ar-uph LAD s/p NSTEMI with peak trop to 1.87 trending downward  -continue ASA/clopidogrel- stop ASA 81mg after 1 month as will be on anticoagulation for AFib as well  -continue atorvastatin 40mg,   -medically manage residual lesions of 75% LPL and RCA    2. ICM/HFrEF 25%  -euvolemic on Lasix 40mg  -continue GDMT with metoprolol succinate and Entresto, BP borderline low limiting uptitration    3. Persistent AFIB  -defer cardioversion for now given s/p recent pLAD stenting to avoid hypotensive episode  -CHADSVasc 4- on Eliquis 5mg BID    4. LOW GRADIENT AS  -need furhter outpatient work up  -eventual dobutamine echo to further assess true AS severity vs. pseudo-AS      Stable from cardiac standpoint for discharge home today, will arrange for office followup next week, need additional samples for Entresto and Eliquis until he can have prescription drug coverage if not able to afford then need switch to warfarin and losartan.         Jose De Jesus Beth DO, [facility]  Faculty Non-Invasive Cardiologist  [phone]

## 2021-08-06 NOTE — DIETITIAN INITIAL EVALUATION ADULT. - PERTINENT MEDS FT
MEDICATIONS  (STANDING):  apixaban 5 milliGRAM(s) Oral every 12 hours  aspirin  chewable 81 milliGRAM(s) Oral daily  atorvastatin 80 milliGRAM(s) Oral at bedtime  clopidogrel Tablet 75 milliGRAM(s) Oral daily  furosemide    Tablet 40 milliGRAM(s) Oral daily  metoprolol succinate  milliGRAM(s) Oral daily  polyethylene glycol 3350 17 Gram(s) Oral daily  sacubitril 24 mG/valsartan 26 mG 1 Tablet(s) Oral two times a day  senna 2 Tablet(s) Oral at bedtime    MEDICATIONS  (PRN):  acetaminophen   Tablet .. 650 milliGRAM(s) Oral every 6 hours PRN Temp greater or equal to 38.5C (101.3F), Mild Pain (1 - 3)  aluminum hydroxide/magnesium hydroxide/simethicone Suspension 30 milliLiter(s) Oral every 4 hours PRN Dyspepsia  melatonin 3 milliGRAM(s) Oral at bedtime PRN Insomnia  ondansetron Injectable 4 milliGRAM(s) IV Push every 8 hours PRN Nausea and/or Vomiting

## 2021-08-06 NOTE — PROGRESS NOTE ADULT - SUBJECTIVE AND OBJECTIVE BOX
[facility]                                                               Office: [address] [address]                                                              Telephone: [phone]. Fax:[phone]                                                                             PROGRESS NOTE  Reason for follow up: NSTEMI, MVD, ICM/HFrEF, Afib, AS  Overnight: No new events.   Update: discharged yesterday pending family pickup, remains on Eliquis and Entresto pending medicare prescription insurance enrollement      Review of symptoms:   Cardiac:  No chest pain. No dyspnea. No palpitations.  Respiratory: No cough. No dyspnea  Gastrointestinal: No diarrhea. No abdominal pain. No bleeding.     Past medical history: No updates.   	  Vital Signs Last 24 Hrs  T(C): 36.4 (08-06-21 @ 08:30), Max: 37.1 (08-05-21 @ 23:05)  T(F): 97.6 (08-06-21 @ 08:30), Max: 98.7 (08-05-21 @ 23:05)  HR: 69 (08-06-21 @ 08:30) (66 - 80)  BP: 105/70 (08-06-21 @ 08:30) (97/59 - 118/70)  BP(mean): 81 (08-06-21 @ 08:30) (81 - 81)  RR: 18 (08-06-21 @ 08:30) (16 - 18)  SpO2: 95% (08-06-21 @ 08:30) (95% - 98%)  I&O's Summary        PHYSICAL EXAM:  Appearance: Comfortable. No acute distress  HEENT:  Head and neck: Atraumatic. Normocephalic.  Normal oral mucosa, PERRL, Neck is supple. No JVD, No carotid bruit.   Neurologic: A&Ox 2-3, no focal deficits. EOMI, Cranial nerves are intact.  Lymphatic: No cervical lymphadenopathy  Cardiovascular: Normal S1 S2, irregular No murmur, rubs/gallops. No JVD, No edema  Respiratory: Lungs clear to auscultation  Gastrointestinal:  Soft, Non-tender, + BS  Lower Extremities: No edema, R-groin mild pea-size swelling, nontender  Psychiatry: Patient is calm. No agitation. Mood & affect appropriate  Skin: No rashes/ecchymoses/cyanosis/ulcers visualized on the face, hands or feet.      CURRENT MEDICATIONS:  MEDICATIONS  (STANDING):  apixaban 5 milliGRAM(s) Oral every 12 hours  aspirin  chewable 81 milliGRAM(s) Oral daily  atorvastatin 80 milliGRAM(s) Oral at bedtime  clopidogrel Tablet 75 milliGRAM(s) Oral daily  furosemide    Tablet 40 milliGRAM(s) Oral daily  metoprolol succinate  milliGRAM(s) Oral daily  polyethylene glycol 3350 17 Gram(s) Oral daily  sacubitril 24 mG/valsartan 26 mG 1 Tablet(s) Oral two times a day  senna 2 Tablet(s) Oral at bedtime    MEDICATIONS  (PRN):  acetaminophen   Tablet .. 650 milliGRAM(s) Oral every 6 hours PRN Temp greater or equal to 38.5C (101.3F), Mild Pain (1 - 3)  aluminum hydroxide/magnesium hydroxide/simethicone Suspension 30 milliLiter(s) Oral every 4 hours PRN Dyspepsia  melatonin 3 milliGRAM(s) Oral at bedtime PRN Insomnia  ondansetron Injectable 4 milliGRAM(s) IV Push every 8 hours PRN Nausea and/or Vomiting      DIAGNOSTIC TESTING:  [ ] Echocardiogram:   < from: TTE Echo Complete w/o Contrast w/ Doppler (07.30.21 @ 08:09) >  Summary:   1. Endocardial visualization was enhanced with intravenous echo contrast.   2. Left ventricular ejection fraction, by visual estimation, is 20 to 25%.   3. Severely decreased global left ventricular systolic function.   4. Multiple left ventricular regional wall motion abnormalities exist. See wall motion findings.   5. Severely enlarged left atrium.   6. Severely enlarged right atrium.   7. Mild mitral annular calcification.   8. Mild mitral valve regurgitation.   9. Moderate tricuspid regurgitation.  10. Peak transaortic gradient equals 14.0 mmHg, mean transaortic gradient equals 8.6 mmHg, the calculated aortic valve area equals 0.59 cm² by the continuity equation consistent with severe aortic stenosis. The Dimensionless Index value is 0.23, also consistent with severe AS.  11. Estimated pulmonary artery systolic pressure is 49.5 mmHg assuming a right atrial pressure of 8 mmHg, which is consistent with mild pulmonary hypertension.  12. Trivial pericardial effusion.  13. No prior studies are available for comparison.    < end of copied text >    [ ]  Catheterization:  < from: Cardiac Cath Lab (08.04.21 @ 15:52) >  LAD:   --  Mid LAD: There was a 95 % stenosis.  COMPLICATIONS: No complications occurred during the cath lab visit.  DIAGNOSTIC IMPRESSIONS: Successful PCI of Prox to Mid LAD with EMILEE x 1  ([place] 2.5x38mm)  Successful Insertion and Removal of Impella CP  Successful RFA pre-closure with Perclose Proglide x 2  DIAGNOSTIC RECOMMENDATIONS: The patient should continue with the present  medications. Patient management should include aggressive medical therapy,  close monitoring of BUN and creatinine, resumption of all previous  activities in 5 days, and a cardiac rehabilitation program. Medical  management is recommended.  INTERVENTIONAL IMPRESSIONS: Successful PCI of Prox to Mid LAD with EMILEE x 1  ([place] 2.5x38mm)  Successful Insertion and Removal of Impella CP  Successful RFA pre-closure with Perclose Proglide x 2    < end of copied text >    [ ] Stress Test:      Labs:                        15.1   10.53 )-----------( 189      ( 06 Aug 2021 05:16 )             45.6     08-05    141  |  102  |  30.4<H>  ----------------------------<  83  4.2   |  25.0  |  1.16    Ca    8.8      05 Aug 2021 08:17  Mg     2.2     08-05    TPro  6.0<L>  /  Alb  3.5  /  TBili  1.5  /  DBili  x   /  AST  50<H>  /  ALT  49<H>  /  AlkPhos  93  08-05     04 Aug 2021 05:27 Troponin 1.35 ng/mL / Creatine Kinase x     /  CKMB x     / CPK Mass Assay % x       02 Aug 2021 06:37 Troponin 1.87 ng/mL / Creatine Kinase x     /  CKMB x     / CPK Mass Assay % x       01 Aug 2021 12:07 Troponin 1.53 ng/mL / Creatine Kinase x     /  CKMB x     / CPK Mass Assay % x          Serum Pro-Brain Natriuretic Peptide: 9841 pg/mL (08-04-21 @ 05:27)  Serum Pro-Brain Natriuretic Peptide: 9980 pg/mL (07-29-21 @ 22:56)    Cholesterol 176 mg/dL; Direct LDL --; HDL Cholesterol, Serum 65 mg/dL; HDL/ Total Cholesterol Ratio Measurement --; Total Cholesterol/ HDL Ratio Measurement --; Triglycerides, Serum 50 mg/dL  A1C with Estimated Average Glucose Result: 5.5 % (07-30-21 @ 08:01)      TELEMETRY: Afib 60-70s, 2 seconds pause

## 2021-08-06 NOTE — DIETITIAN INITIAL EVALUATION ADULT. - OTHER INFO
88 year old male with new onset Afib w/ RVR, NSTEMI, CAD s/p LHC, new onset systolic CHF. Spoke with pt and pts daughter at bedside. Pt reports good appetite/po intake prior to admission and currently. Daughter at bedside and confirms pt has been eating very well, usually finishing all of his food. Denies any weight changes. Diet education deferred at this time. RD to follow up. 88 year old male with new onset Afib w/ RVR, NSTEMI, CAD s/p LHC, new onset systolic CHF.     Spoke with pt and pts daughter at bedside. Pt reports good appetite/po intake prior to admission and currently. Daughter at bedside and confirms pt has been eating very well, usually finishing all of his food. Denies any weight changes. Diet education deferred at this time. RD to follow up.

## 2021-08-06 NOTE — DIETITIAN INITIAL EVALUATION ADULT. - PERSON TAUGHT/METHOD
verbal instruction/patient instructed/daughter instructed/teach back - (Patient repeats in own words)

## 2021-08-06 NOTE — PROGRESS NOTE ADULT - PROVIDER SPECIALTY LIST ADULT
Cardiology
Hospitalist
Cardiology
Cardiology
Hospitalist
Cardiology
Hospitalist
Hospitalist
Cardiology
Hospitalist
Hospitalist
Cardiology

## 2021-08-06 NOTE — DIETITIAN INITIAL EVALUATION ADULT. - PHYSCIAL ASSESSMENT
Pt appears thin, however, he has good po intake and no reported weight changes- physical changes likely unavoidable given advanced age

## 2021-08-06 NOTE — PROGRESS NOTE ADULT - NSICDXPILOT_GEN_ALL_CORE
[place]

## 2021-08-26 ENCOUNTER — NON-APPOINTMENT (OUTPATIENT)
Age: 86
End: 2021-08-26

## 2021-08-26 DIAGNOSIS — F17.200 NICOTINE DEPENDENCE, UNSPECIFIED, UNCOMPLICATED: ICD-10-CM

## 2021-08-26 DIAGNOSIS — F05 DELIRIUM DUE TO KNOWN PHYSIOLOGICAL CONDITION: ICD-10-CM

## 2021-08-27 ENCOUNTER — NON-APPOINTMENT (OUTPATIENT)
Age: 86
End: 2021-08-27

## 2021-08-27 ENCOUNTER — APPOINTMENT (OUTPATIENT)
Dept: CARDIOLOGY | Facility: CLINIC | Age: 86
End: 2021-08-27
Payer: MEDICARE

## 2021-08-27 VITALS — SYSTOLIC BLOOD PRESSURE: 124 MMHG | DIASTOLIC BLOOD PRESSURE: 78 MMHG

## 2021-08-27 VITALS
OXYGEN SATURATION: 96 % | TEMPERATURE: 97.8 F | HEART RATE: 77 BPM | SYSTOLIC BLOOD PRESSURE: 126 MMHG | HEIGHT: 69 IN | DIASTOLIC BLOOD PRESSURE: 78 MMHG | BODY MASS INDEX: 21.48 KG/M2 | WEIGHT: 145 LBS

## 2021-08-27 DIAGNOSIS — I21.4 NON-ST ELEVATION (NSTEMI) MYOCARDIAL INFARCTION: ICD-10-CM

## 2021-08-27 PROCEDURE — 99214 OFFICE O/P EST MOD 30 MIN: CPT

## 2021-08-27 PROCEDURE — 93000 ELECTROCARDIOGRAM COMPLETE: CPT

## 2021-08-27 RX ORDER — KRILL/OM-3/DHA/EPA/PHOSPHO/AST 1000-230MG
81 CAPSULE ORAL
Qty: 90 | Refills: 0 | Status: DISCONTINUED | COMMUNITY
End: 2021-08-27

## 2021-08-27 RX ORDER — POLYETHYLENE GLYCOL 3350 17 G/17G
17 POWDER, FOR SOLUTION ORAL DAILY
Refills: 0 | Status: DISCONTINUED | COMMUNITY
End: 2021-08-27

## 2021-08-27 NOTE — DISCUSSION/SUMMARY
[FreeTextEntry1] : 88M no prior medical care >15 yrs presented to [facility] ER on 7/30/21 with intermittent chest pain and shortness of breath, NSTEMI with peak tnl 1.87, also found to be in newly diagnosed Afib RVR, TTE revealed EF 20-25%, mod TR, mild PHTN, severe low flow/low gradient AS, cath on 7/30/21 with triple vessels CAD, 99% thrombotic culprit lesion pLAD and D1 90% disease, also with 75% lesion LPL and mRCA, underwent high risk PCI with Impella support to pLAD, discharged home on 8/6/21 on Eliquis and Entresto, presents for initial outpatient cardiology visit.\par \par Patient and daughter are hesitant for further workup and intervention for Afib and AS for now, however they also not ready to consider hospice care which I also agree as patient still indepdent with ADL and with limited symptoms. Explain can continue medical management for now, if becomes more symptomatic to consider further intervention for AS and Afib. \par \par 1. CAD s/p impella support EMILEE to [id number]-[id number]-ddk LAD s/p NSTEMI with peak trop to 1.87 trending downward\par -continue ASA/clopidogrel- continue clopidogrel 75mg, stop ASA 81mg after end of August as will be on anticoagulation for AFib as well\par -continue atorvastatin 40mg, \par -medically manage residual lesions of 75% LPL and RCA\par \par 2. ICM/HFrEF 25%\par -euvolemic on Lasix 40mg, advised low salt diet\par -continue GDMT with metoprolol succinate and Entresto, defer dose escalation for now as SBP 120s with sensation fo fatigue. \par \par 3. Persistent AFIB\par -remains in Afib on EKG today, consider rhythm control with trial of PIO/cardioversion, but patient was to defer further procedure\par -rate controlled on metoprolol. \par -CHADSVasc 4- on Eliquis 5mg BID\par \par 4. LOW GRADIENT AS\par -eventual dobutamine stress Echo to further assess true AS severity vs. pseudo-AS, however he does not want further testing for now. \par \par \par 1 month samples of Eliquis and Entresto provided as currently with prescription drug coverage, need application for financial assistance program. \par \par Follow up in 2 months

## 2021-08-27 NOTE — CARDIOLOGY SUMMARY
[de-identified] : 8/27/21- Afib 57, normal axis, Q-wave V1-V2, QTc 475 [de-identified] : 7/30/21- \par Summary:\par  1. Endocardial visualization was enhanced with intravenous echo contrast.\par  2. Left ventricular ejection fraction, by visual estimation, is 20 to 25%.\par  3. Severely decreased global left ventricular systolic function.\par  4. Multiple left ventricular regional wall motion abnormalities exist. See wall motion findings.\par  5. Severely enlarged left atrium.\par  6. Severely enlarged right atrium.\par  7. Mild mitral annular calcification.\par  8. Mild mitral valve regurgitation.\par  9. Moderate tricuspid regurgitation.\par 10. Peak transaortic gradient equals 14.0 mmHg, mean transaortic gradient equals 8.6 mmHg, the calculated aortic valve area equals 0.59 cm² by the continuity equation consistent with severe aortic stenosis. The Dimensionless Index value is 0.23, also consistent with severe AS.\par 11. Estimated pulmonary artery systolic pressure is 49.5 mmHg assuming a right atrial pressure of 8 mmHg, which is consistent with mild pulmonary hypertension.\par 12. Trivial pericardial effusion.\par 13. No prior studies are available for comparison. [de-identified] : 7/30/21- DIAGNOSTIC IMPRESSIONS: There is significant triple vessel coronary artery\par disease.\par Prox LAD=99% (REYNALDO 2)\par Ostial Diag=90%\par Prox LPL=75%\par Prox to Mid RCA=75% Left ventricular function is abnormal.\par 8/4/21- CORONARY VESSELS:\par LAD:   --  Mid LAD: There was a 95 % stenosis.\par COMPLICATIONS: No complications occurred during the cath lab visit.\par DIAGNOSTIC IMPRESSIONS: Successful PCI of Prox to Mid LAD with EMILEE x 1\par ([place] 2.5x38mm)\par Successful Insertion and Removal of Impella CP\par Successful RFA pre-closure with Perclose Proglide x 2

## 2021-08-27 NOTE — HISTORY OF PRESENT ILLNESS
[FreeTextEntry1] : 88M no prior medical care >15 yrs presented to [facility] ER on 7/30/21 with intermittent chest pain and shortness of breath, NSTEMI with peak tnl 1.87, also found to be in newly diagnosed Afib RVR, TTE revealed EF 20-25%, mod TR, mild PHTN, severe low flow/low gradient AS, cath on 7/30/21 with triple vessels CAD, 99% thrombotic culprit lesion pLAD and D1 90% disease, also with 75% lesion LPL and mRCA, underwent high risk PCI with Impella support to pLAD, discharged home on 8/6/21 on Eliquis and Entresto, presents for initial outpatient cardiology visit.\par \par Patient presents with his daughter for the visit. Feeling well since discharge, remains weak and fatigue at times and afraid of getting out, but able to ambulate to walk his mailbox. No chest pain or shortness of breath. Denies any bleeding. \par \par Completed COVID vaccine.\par No alcohol use\par Living with his daughter\par Does not have medicare prescription drug coverage, pending enrollment in October. \par \par

## 2021-08-27 NOTE — PHYSICAL EXAM
[Well Developed] : well developed [Well Nourished] : well nourished [No Acute Distress] : no acute distress [Normal Conjunctiva] : normal conjunctiva [Normal Venous Pressure] : normal venous pressure [No Carotid Bruit] : no carotid bruit [Normal S1, S2] : normal S1, S2 [No Murmur] : no murmur [No Rub] : no rub [No Gallop] : no gallop [No Respiratory Distress] : no respiratory distress  [Soft] : abdomen soft [Non Tender] : non-tender [No Masses/organomegaly] : no masses/organomegaly [Normal Bowel Sounds] : normal bowel sounds [Normal Gait] : normal gait [No Edema] : no edema [No Cyanosis] : no cyanosis [No Clubbing] : no clubbing [No Varicosities] : no varicosities [No Rash] : no rash [No Skin Lesions] : no skin lesions [Moves all extremities] : moves all extremities [No Focal Deficits] : no focal deficits [Normal Speech] : normal speech [Alert and Oriented] : alert and oriented [Normal memory] : normal memory [de-identified] : L-basilar crackles

## 2021-11-18 NOTE — CARDIOLOGY SUMMARY
[de-identified] : 8/27/21- Afib 57, normal axis, Q-wave V1-V2, QTc 475 [de-identified] : 7/30/21- \par Summary:\par  1. Endocardial visualization was enhanced with intravenous echo contrast.\par  2. Left ventricular ejection fraction, by visual estimation, is 20 to 25%.\par  3. Severely decreased global left ventricular systolic function.\par  4. Multiple left ventricular regional wall motion abnormalities exist. See wall motion findings.\par  5. Severely enlarged left atrium.\par  6. Severely enlarged right atrium.\par  7. Mild mitral annular calcification.\par  8. Mild mitral valve regurgitation.\par  9. Moderate tricuspid regurgitation.\par 10. Peak transaortic gradient equals 14.0 mmHg, mean transaortic gradient equals 8.6 mmHg, the calculated aortic valve area equals 0.59 cm² by the continuity equation consistent with severe aortic stenosis. The Dimensionless Index value is 0.23, also consistent with severe AS.\par 11. Estimated pulmonary artery systolic pressure is 49.5 mmHg assuming a right atrial pressure of 8 mmHg, which is consistent with mild pulmonary hypertension.\par 12. Trivial pericardial effusion.\par 13. No prior studies are available for comparison. [de-identified] : 7/30/21- DIAGNOSTIC IMPRESSIONS: There is significant triple vessel coronary artery\par disease.\par Prox LAD=99% (REYNALDO 2)\par Ostial Diag=90%\par Prox LPL=75%\par Prox to Mid RCA=75% Left ventricular function is abnormal.\par 8/4/21- CORONARY VESSELS:\par LAD:   --  Mid LAD: There was a 95 % stenosis.\par COMPLICATIONS: No complications occurred during the cath lab visit.\par DIAGNOSTIC IMPRESSIONS: Successful PCI of Prox to Mid LAD with EMILEE x 1\par ([place] 2.5x38mm)\par Successful Insertion and Removal of Impella CP\par Successful RFA pre-closure with Perclose Proglide x 2

## 2021-11-18 NOTE — HISTORY OF PRESENT ILLNESS
[FreeTextEntry1] : 88M no prior medical care >15 yrs presented to [facility] ER on 7/30/21 with intermittent chest pain and shortness of breath, NSTEMI with peak tnl 1.87, also found to be in newly diagnosed Afib RVR, TTE revealed EF 20-25%, mod TR, mild PHTN, severe low flow/low gradient AS, cath on 7/30/21 with triple vessels CAD, 99% thrombotic culprit lesion pLAD and D1 90% disease, also with 75% lesion LPL and mRCA, underwent high risk PCI with Impella support to pLAD, discharged home on 8/6/21 on Eliquis and Entresto, presented for initial outpatient cardiology visit seen on 8/2021, patient/daughter hesitant about further workup/intervention for Afib and AS, now presents for follow up.\par \par \par Prior visit 8/2021:\par Patient presents with his daughter for the visit. Feeling well since discharge, remains weak and fatigue at times and afraid of getting out, but able to ambulate to walk his mailbox. No chest pain or shortness of breath. Denies any bleeding. \par \par Completed COVID vaccine.\par No alcohol use\par Living with his daughter\par Does not have medicare prescription drug coverage, pending enrollment in October. \par \par

## 2021-11-18 NOTE — DISCUSSION/SUMMARY
[FreeTextEntry1] : 88M no prior medical care >15 yrs presented to [facility] ER on 7/30/21 with intermittent chest pain and shortness of breath, NSTEMI with peak tnl 1.87, also found to be in newly diagnosed Afib RVR, TTE revealed EF 20-25%, mod TR, mild PHTN, severe low flow/low gradient AS, cath on 7/30/21 with triple vessels CAD, 99% thrombotic culprit lesion pLAD and D1 90% disease, also with 75% lesion LPL and mRCA, underwent high risk PCI with Impella support to pLAD, discharged home on 8/6/21 on Eliquis and Entresto, presents for initial outpatient cardiology visit.\par \par Patient and daughter are hesitant for further workup and intervention for Afib and AS for now, however they also not ready to consider hospice care which I also agree as patient still indepdent with ADL and with limited symptoms. Explain can continue medical management for now, if becomes more symptomatic to consider further intervention for AS and Afib. \par \par 1. CAD s/p impella support EMILEE to [id number] LAD s/p NSTEMI with peak trop to 1.87 trending downward\par -continue ASA/clopidogrel- continue clopidogrel 75mg, stop ASA 81mg after end of August as will be on anticoagulation for AFib as well\par -continue atorvastatin 40mg, \par -medically manage residual lesions of 75% LPL and RCA\par \par 2. ICM/HFrEF 25%\par -euvolemic on Lasix 40mg, advised low salt diet\par -continue GDMT with metoprolol succinate and Entresto, defer dose escalation for now as SBP 120s with sensation fo fatigue. \par \par 3. Persistent AFIB\par -remains in Afib on EKG today, consider rhythm control with trial of PIO/cardioversion, but patient was to defer further procedure\par -rate controlled on metoprolol. \par -CHADSVasc 4- on Eliquis 5mg BID\par \par 4. LOW GRADIENT AS\par -eventual dobutamine stress Echo to further assess true AS severity vs. pseudo-AS, however he does not want further testing for now. \par \par \par 1 month samples of Eliquis and Entresto provided as currently with prescription drug coverage, need application for financial assistance program. \par \par Follow up in 2 months

## 2021-11-19 ENCOUNTER — APPOINTMENT (OUTPATIENT)
Dept: CARDIOLOGY | Facility: CLINIC | Age: 86
End: 2021-11-19

## 2022-01-07 ENCOUNTER — APPOINTMENT (OUTPATIENT)
Dept: CARDIOLOGY | Facility: CLINIC | Age: 87
End: 2022-01-07

## 2022-03-04 ENCOUNTER — APPOINTMENT (OUTPATIENT)
Dept: CARDIOLOGY | Facility: CLINIC | Age: 87
End: 2022-03-04

## 2022-03-10 ENCOUNTER — NON-APPOINTMENT (OUTPATIENT)
Age: 87
End: 2022-03-10

## 2022-03-18 ENCOUNTER — APPOINTMENT (OUTPATIENT)
Dept: CARDIOLOGY | Facility: CLINIC | Age: 87
End: 2022-03-18
Payer: MEDICARE

## 2022-03-18 ENCOUNTER — NON-APPOINTMENT (OUTPATIENT)
Age: 87
End: 2022-03-18

## 2022-03-18 VITALS
BODY MASS INDEX: 22.66 KG/M2 | SYSTOLIC BLOOD PRESSURE: 138 MMHG | TEMPERATURE: 98 F | OXYGEN SATURATION: 97 % | WEIGHT: 153 LBS | HEIGHT: 69 IN | HEART RATE: 52 BPM | DIASTOLIC BLOOD PRESSURE: 78 MMHG

## 2022-03-18 DIAGNOSIS — I48.91 UNSPECIFIED ATRIAL FIBRILLATION: ICD-10-CM

## 2022-03-18 PROCEDURE — 93000 ELECTROCARDIOGRAM COMPLETE: CPT

## 2022-03-18 PROCEDURE — 99215 OFFICE O/P EST HI 40 MIN: CPT

## 2022-03-18 NOTE — CARDIOLOGY SUMMARY
[de-identified] : 8/27/21- Afib 57, normal axis, Q-wave V1-V2, QTc 475\par 3/18/22- Afib 84, normal axis, Q-wave V1-V3, PVC, QTc 426, low voltage [de-identified] : 7/30/21- \par Summary:\par  1. Endocardial visualization was enhanced with intravenous echo contrast.\par  2. Left ventricular ejection fraction, by visual estimation, is 20 to 25%.\par  3. Severely decreased global left ventricular systolic function.\par  4. Multiple left ventricular regional wall motion abnormalities exist. See wall motion findings.\par  5. Severely enlarged left atrium.\par  6. Severely enlarged right atrium.\par  7. Mild mitral annular calcification.\par  8. Mild mitral valve regurgitation.\par  9. Moderate tricuspid regurgitation.\par 10. Peak transaortic gradient equals 14.0 mmHg, mean transaortic gradient equals 8.6 mmHg, the calculated aortic valve area equals 0.59 cm² by the continuity equation consistent with severe aortic stenosis. The Dimensionless Index value is 0.23, also consistent with severe AS.\par 11. Estimated pulmonary artery systolic pressure is 49.5 mmHg assuming a right atrial pressure of 8 mmHg, which is consistent with mild pulmonary hypertension.\par 12. Trivial pericardial effusion.\par 13. No prior studies are available for comparison. [de-identified] : 7/30/21- DIAGNOSTIC IMPRESSIONS: There is significant triple vessel coronary artery\par disease.\par Prox LAD=99% (REYNALDO 2)\par Ostial Diag=90%\par Prox LPL=75%\par Prox to Mid RCA=75% Left ventricular function is abnormal.\par 8/4/21- CORONARY VESSELS:\par LAD:   --  Mid LAD: There was a 95 % stenosis.\par COMPLICATIONS: No complications occurred during the cath lab visit.\par DIAGNOSTIC IMPRESSIONS: Successful PCI of Prox to Mid LAD with EMILEE x 1\par ([place] 2.5x38mm)\par Successful Insertion and Removal of Impella CP\par Successful RFA pre-closure with Perclose Proglide x 2

## 2022-03-18 NOTE — PHYSICAL EXAM
[Well Developed] : well developed [No Acute Distress] : no acute distress [Well Nourished] : well nourished [Normal Conjunctiva] : normal conjunctiva [Normal Venous Pressure] : normal venous pressure [No Carotid Bruit] : no carotid bruit [Normal S1, S2] : normal S1, S2 [No Gallop] : no gallop [No Rub] : no rub [Clear Lung Fields] : clear lung fields [Good Air Entry] : good air entry [No Respiratory Distress] : no respiratory distress  [Soft] : abdomen soft [Non Tender] : non-tender [No Masses/organomegaly] : no masses/organomegaly [Normal Bowel Sounds] : normal bowel sounds [Normal Gait] : normal gait [No Cyanosis] : no cyanosis [No Clubbing] : no clubbing [No Varicosities] : no varicosities [No Rash] : no rash [No Skin Lesions] : no skin lesions [Moves all extremities] : moves all extremities [No Focal Deficits] : no focal deficits [Normal Speech] : normal speech [Alert and Oriented] : alert and oriented [Normal memory] : normal memory [de-identified] : soft systolic 3/6 RUSB [de-identified] : +1 bilateral pitting edema R>L

## 2022-03-18 NOTE — HISTORY OF PRESENT ILLNESS
[FreeTextEntry1] : 88M no prior medical care >15 yrs presented to [facility] ER on 7/30/21 with intermittent chest pain and shortness of breath, NSTEMI with peak tnl 1.87, also found to be in newly diagnosed Afib RVR, TTE revealed EF 20-25%, mod TR, mild PHTN, severe low flow/low gradient AS, cath on 7/30/21 with triple vessels CAD, 99% thrombotic culprit lesion pLAD and D1 90% disease, also with 75% lesion LPL and mRCA, underwent high risk PCI with Impella support to pLAD, discharged home on 8/6/21 on Eliquis and Entresto, presented for initial outpatient cardiology visit seen on 8/2021, patient/daughter hesitant about further workup/intervention for persistent Afib and AS, missed prior follow up appointment in 11/2021, seen his PCP (Dr. Caesar Cespedes in [place]) noted low BP and his cardiac meds discontinued (Lasix, clopidogrel and metoprolol), now presents for follow up.\par \par Present with his daughter for the visit. \par He was off clopidogrel for 1 week since resumed when his daughter reach out to my office last week. When off Lasix also noted R-foot swelling, stop going out to the mail box due to foot pain, not going outside of the house, no stairs in the house on 1st floor living area, denies chest pain or exertional dyspnea, no recent fall or bleeding, no syncope. \par \par Not yet receive COVID vaccine booster. \par \par Prior visit 8/2021:\par Patient presents with his daughter for the visit. Feeling well since discharge, remains weak and fatigue at times and afraid of getting out, but able to ambulate to walk his mailbox. No chest pain or shortness of breath. Denies any bleeding. \par \par Completed COVID vaccine.\par No alcohol use\par Living with his daughter\par Does not have medicare prescription drug coverage, pending enrollment in October. \par \par

## 2022-03-31 ENCOUNTER — APPOINTMENT (OUTPATIENT)
Dept: CARDIOLOGY | Facility: CLINIC | Age: 87
End: 2022-03-31

## 2022-04-20 ENCOUNTER — APPOINTMENT (OUTPATIENT)
Dept: CARDIOLOGY | Facility: CLINIC | Age: 87
End: 2022-04-20

## 2022-04-29 ENCOUNTER — APPOINTMENT (OUTPATIENT)
Dept: CARDIOLOGY | Facility: CLINIC | Age: 87
End: 2022-04-29

## 2022-05-25 ENCOUNTER — APPOINTMENT (OUTPATIENT)
Dept: CARDIOLOGY | Facility: CLINIC | Age: 87
End: 2022-05-25
Payer: MEDICARE

## 2022-05-25 PROCEDURE — 93306 TTE W/DOPPLER COMPLETE: CPT

## 2022-05-25 RX ORDER — SACUBITRIL AND VALSARTAN 24; 26 MG/1; MG/1
24-26 TABLET, FILM COATED ORAL TWICE DAILY
Qty: 180 | Refills: 2 | Status: DISCONTINUED | COMMUNITY
Start: 1900-01-01 | End: 2022-05-25

## 2022-06-07 ENCOUNTER — NON-APPOINTMENT (OUTPATIENT)
Age: 87
End: 2022-06-07

## 2022-06-10 ENCOUNTER — NON-APPOINTMENT (OUTPATIENT)
Age: 87
End: 2022-06-10

## 2022-08-05 ENCOUNTER — NON-APPOINTMENT (OUTPATIENT)
Age: 87
End: 2022-08-05

## 2022-08-05 ENCOUNTER — APPOINTMENT (OUTPATIENT)
Dept: CARDIOLOGY | Facility: CLINIC | Age: 87
End: 2022-08-05

## 2022-08-05 VITALS
DIASTOLIC BLOOD PRESSURE: 90 MMHG | HEART RATE: 56 BPM | TEMPERATURE: 98.5 F | BODY MASS INDEX: 21.77 KG/M2 | HEIGHT: 69 IN | OXYGEN SATURATION: 100 % | SYSTOLIC BLOOD PRESSURE: 124 MMHG | WEIGHT: 147 LBS

## 2022-08-05 VITALS — SYSTOLIC BLOOD PRESSURE: 122 MMHG | DIASTOLIC BLOOD PRESSURE: 80 MMHG

## 2022-08-05 PROCEDURE — 93000 ELECTROCARDIOGRAM COMPLETE: CPT

## 2022-08-05 PROCEDURE — 99214 OFFICE O/P EST MOD 30 MIN: CPT

## 2022-08-05 NOTE — DISCUSSION/SUMMARY
[FreeTextEntry1] : 89M no prior medical care >15 yrs presented to [facility] ER on 7/30/21 with intermittent chest pain and shortness of breath, NSTEMI with peak tnl 1.87, also found to be in newly diagnosed Afib RVR, TTE revealed EF 20-25%, mod TR, mild PHTN, severe low flow/low gradient AS, cath on 7/30/21 with triple vessels CAD, 99% thrombotic culprit lesion pLAD and D1 90% disease, also with 75% lesion LPL and mRCA, underwent high risk PCI with Impella support to pLAD, discharged home on 8/6/21 on Eliquis and Entresto, presented for initial outpatient cardiology visit seen on 8/2021, patient/daughter hesitant about further workup/intervention for persistent Afib and AS, missed prior follow up appointment in 11/2021, seen his PCP (Dr. Caesar Cespedes in [place]) noted low BP and his cardiac meds discontinued (Lasix, clopidogrel and metoprolol), last seen 3/2022 resumed GDMT switched to losartan due to high copay for Entresto, repeat Echo 5/2022 improved LV EF 51% but with low gradient severe aortic stenosis (MUSTAPHA 0.83 cm2), patient declined surgical evaluation/TAVR, now presents for follow up.\par \par Overall stable denies cardiac complains, continue to decline TAVR workup likely minimizing symptoms as not been much active or exertional except walking inside the house. Patient appears hesitant for surgery as previously reports his older brother had suffered surgical complication in the past but explained in details that every situation is unique. \par \par \par 1. CAD s/p impella support EMILEE to [id number] LAD during index event of NSTEMI in 7/2021\par -continue clopidogrel 75mg alone without ASA 81mg to avoid triple therapy with anticoagulation for AFib as well\par -continue atorvastatin 40mg, \par -medically manage for now the residual lesions of 75% LPL and RCA as without angina or recurrent HF admission. \par \par 2. ICM/HFimEF \par -euvolemic on Lasix 40mg, advised low salt diet\par -continue GDMT with metoprolol succinate at 50mg and to start losartan as not able to to afford Entresto with high copay\par \par \par 3. Persistent AFIB\par -remains in Afib on EKG today but rate controlled on metoprolol, defer rhythm management as asymptomatic and no recurrent HF\par -CHADSVasc 4- on Eliquis 5mg BID\par \par 4. LOW GRADIENT severe AS\par -reiterated to consider TAVR eval. as if further progression to symptomatic severe AS can lead to recurrent heart failure and possible death, daughter will discuss with him again about his goals-of-care. \par \par \par Follow up in 4 months.  [EKG obtained to assist in diagnosis and management of assessed problem(s)] : EKG obtained to assist in diagnosis and management of assessed problem(s)

## 2022-08-05 NOTE — END OF VISIT
[Time Spent: ___ minutes] : I have spent [unfilled] minutes of time on the encounter.
Equal and normal pulses (carotid, femoral, dorsalis pedis)

## 2022-08-05 NOTE — HISTORY OF PRESENT ILLNESS
[FreeTextEntry1] : 89M no prior medical care >15 yrs presented to [facility] ER on 7/30/21 with intermittent chest pain and shortness of breath, NSTEMI with peak tnl 1.87, also found to be in newly diagnosed Afib RVR, TTE revealed EF 20-25%, mod TR, mild PHTN, severe low flow/low gradient AS, cath on 7/30/21 with triple vessels CAD, 99% thrombotic culprit lesion pLAD and D1 90% disease, also with 75% lesion LPL and mRCA, underwent high risk PCI with Impella support to pLAD, discharged home on 8/6/21 on Eliquis and Entresto, presented for initial outpatient cardiology visit seen on 8/2021, patient/daughter hesitant about further workup/intervention for persistent Afib and AS, missed prior follow up appointment in 11/2021, seen his PCP (Dr. Caesar Cespedes in [place]) noted low BP and his cardiac meds discontinued (Lasix, clopidogrel and metoprolol), last seen 3/2022 resumed GDMT switched to losartan due to high copay for Entresto, repeat Echo 5/2022 improved LV EF 51% but with low gradient severe aortic stenosis (MUSTAPHA 0.83 cm2), patient declined surgical evaluation/TAVR, now presents for follow up.\par \par Patient presents with her daughter for the visit. \par Has not start the losartan yet as still finishing up the Entresto. Mostly walking inside the house due to foot pain wearing slippers, does not go out to grocery shop. He is still declining TAVR procedure. \par \par Not yet obtain COVID vaccine booster. \par \par Recent lab 4/2022: , Cr. 1.25\par \par Prior visit 3/2022: \par Present with his daughter for the visit. \par He was off clopidogrel for 1 week since resumed when his daughter reach out to my office last week. When off Lasix also noted R-foot swelling, stop going out to the mail box due to foot pain, not going outside of the house, no stairs in the house on 1st floor living area, denies chest pain or exertional dyspnea, no recent fall or bleeding, no syncope. \par \par Not yet receive COVID vaccine booster. \par \par Prior visit 8/2021:\par Patient presents with his daughter for the visit. Feeling well since discharge, remains weak and fatigue at times and afraid of getting out, but able to ambulate to walk his mailbox. No chest pain or shortness of breath. Denies any bleeding. \par \par Completed COVID vaccine.\par No alcohol use\par Living with his daughter\par Does not have medicare prescription drug coverage, pending enrollment in October. \par \par

## 2022-08-05 NOTE — PHYSICAL EXAM

## 2022-08-05 NOTE — CARDIOLOGY SUMMARY
[de-identified] : 8/27/21- Afib 57, normal axis, Q-wave V1-V2, QTc 475\par 3/18/22- Afib 84, normal axis, Q-wave V1-V3, PVC, QTc 426, low voltage\par 8/5/22- Afib 66, normal axis, PVCs, Q-wave V1-V3, III/aVF, QTc 424 [de-identified] : 5/25/22- LV EF 51%, hypokinesis of [id number] inferoseptum, PASP 49 mmHg, severe biatrial enlargement, low gradietn severe AS (MUSTAPHA 0.83 cm2), moderate MR\par \par 7/30/21- \par Summary:\par  1. Endocardial visualization was enhanced with intravenous echo contrast.\par  2. Left ventricular ejection fraction, by visual estimation, is 20 to 25%.\par  3. Severely decreased global left ventricular systolic function.\par  4. Multiple left ventricular regional wall motion abnormalities exist. See wall motion findings.\par  5. Severely enlarged left atrium.\par  6. Severely enlarged right atrium.\par  7. Mild mitral annular calcification.\par  8. Mild mitral valve regurgitation.\par  9. Moderate tricuspid regurgitation.\par 10. Peak transaortic gradient equals 14.0 mmHg, mean transaortic gradient equals 8.6 mmHg, the calculated aortic valve area equals 0.59 cm² by the continuity equation consistent with severe aortic stenosis. The Dimensionless Index value is 0.23, also consistent with severe AS.\par 11. Estimated pulmonary artery systolic pressure is 49.5 mmHg assuming a right atrial pressure of 8 mmHg, which is consistent with mild pulmonary hypertension.\par 12. Trivial pericardial effusion.\par 13. No prior studies are available for comparison. [de-identified] : 7/30/21- DIAGNOSTIC IMPRESSIONS: There is significant triple vessel coronary artery\par disease.\par Prox LAD=99% (REYNALDO 2)\par Ostial Diag=90%\par Prox LPL=75%\par Prox to Mid RCA=75% Left ventricular function is abnormal.\par 8/4/21- CORONARY VESSELS:\par LAD:   --  Mid LAD: There was a 95 % stenosis.\par COMPLICATIONS: No complications occurred during the cath lab visit.\par DIAGNOSTIC IMPRESSIONS: Successful PCI of Prox to Mid LAD with EMILEE x 1\par ([place] 2.5x38mm)\par Successful Insertion and Removal of Impella CP\par Successful RFA pre-closure with Perclose Proglide x 2

## 2023-02-09 ENCOUNTER — RX RENEWAL (OUTPATIENT)
Age: 88
End: 2023-02-09

## 2023-02-28 ENCOUNTER — RX RENEWAL (OUTPATIENT)
Age: 88
End: 2023-02-28

## 2023-05-02 ENCOUNTER — NON-APPOINTMENT (OUTPATIENT)
Age: 88
End: 2023-05-02

## 2023-05-02 ENCOUNTER — APPOINTMENT (OUTPATIENT)
Dept: CARDIOLOGY | Facility: CLINIC | Age: 88
End: 2023-05-02
Payer: MEDICARE

## 2023-05-02 VITALS
HEIGHT: 69 IN | HEART RATE: 72 BPM | WEIGHT: 149.19 LBS | SYSTOLIC BLOOD PRESSURE: 118 MMHG | TEMPERATURE: 97.3 F | DIASTOLIC BLOOD PRESSURE: 76 MMHG | BODY MASS INDEX: 22.1 KG/M2 | OXYGEN SATURATION: 95 %

## 2023-05-02 DIAGNOSIS — I25.5 ISCHEMIC CARDIOMYOPATHY: ICD-10-CM

## 2023-05-02 PROCEDURE — 99215 OFFICE O/P EST HI 40 MIN: CPT

## 2023-05-02 PROCEDURE — 93000 ELECTROCARDIOGRAM COMPLETE: CPT

## 2023-05-02 RX ORDER — ATORVASTATIN CALCIUM 40 MG/1
40 TABLET, FILM COATED ORAL
Qty: 90 | Refills: 3 | Status: DISCONTINUED | COMMUNITY
End: 2023-05-02

## 2023-05-02 NOTE — HISTORY OF PRESENT ILLNESS
[FreeTextEntry1] : 89M no prior medical care >15 yrs presented to [facility] ER on 7/30/21 with intermittent chest pain and shortness of breath, NSTEMI with peak tnl 1.87, also found to be in newly diagnosed Afib RVR, TTE revealed EF 20-25%, mod TR, mild PHTN, severe low flow/low gradient AS, cath on 7/30/21 with triple vessels CAD, 99% thrombotic culprit lesion pLAD and D1 90% disease, also with 75% lesion LPL and mRCA, underwent high risk PCI with Impella support to pLAD, discharged home on 8/6/21 on Eliquis and Entresto, presented for initial outpatient cardiology visit seen on 8/2021, patient/daughter hesitant about further workup/intervention for persistent Afib and AS, missed prior follow up appointment in 11/2021, seen his PCP (Dr. Caesar Cespedes in [place]) noted low BP and his cardiac meds discontinued (Lasix, clopidogrel and metoprolol), last seen 3/2022 resumed GDMT switched to losartan due to high copay for Entresto, repeat Echo 5/2022 improved LV EF 51% but with low gradient severe aortic stenosis (MUSTAPHA 0.83 cm2), patient declined surgical evaluation/TAVR, last seen 8/2022 not able to afford Entresto switched to losartan, now presents for follow up.\par \par Patient presents with his son for the visit, he lives at home with his daughter\par Reports feeling well, no shortness of breath or chest pain, no bleeding or fall, not ambulating much mostly within the house, does not go out much, complains of frequent urination with Lasix 40mg use takes around 8-9 am. Has not seen his PCP recently and has no blood work. \par \par \par Prior visit 8/2022: \par Patient presents with her daughter for the visit. \par Has not start the losartan yet as still finishing up the Entresto. Mostly walking inside the house due to foot pain wearing slippers, does not go out to grocery shop. He is still declining TAVR procedure. \par \par Not yet obtain COVID vaccine booster. \par \par Recent lab 4/2022: , Cr. 1.25\par \par Prior visit 3/2022: \par Present with his daughter for the visit. \par He was off clopidogrel for 1 week since resumed when his daughter reach out to my office last week. When off Lasix also noted R-foot swelling, stop going out to the mail box due to foot pain, not going outside of the house, no stairs in the house on 1st floor living area, denies chest pain or exertional dyspnea, no recent fall or bleeding, no syncope. \par \par Not yet receive COVID vaccine booster. \par \par Prior visit 8/2021:\par Patient presents with his daughter for the visit. Feeling well since discharge, remains weak and fatigue at times and afraid of getting out, but able to ambulate to walk his mailbox. No chest pain or shortness of breath. Denies any bleeding. \par \par Completed COVID vaccine.\par No alcohol use\par Living with his daughter\par Does not have medicare prescription drug coverage, pending enrollment in October. \par \par

## 2023-05-02 NOTE — DISCUSSION/SUMMARY
[FreeTextEntry1] : 89M no prior medical care >15 yrs presented to [facility] ER on 7/30/21 with intermittent chest pain and shortness of breath, NSTEMI with peak tnl 1.87, also found to be in newly diagnosed Afib RVR, TTE revealed EF 20-25%, mod TR, mild PHTN, severe low flow/low gradient AS, cath on 7/30/21 with triple vessels CAD, 99% thrombotic culprit lesion pLAD and D1 90% disease, also with 75% lesion LPL and mRCA, underwent high risk PCI with Impella support to pLAD, discharged home on 8/6/21 on Eliquis and Entresto, presented for initial outpatient cardiology visit seen on 8/2021, patient/daughter hesitant about further workup/intervention for persistent Afib and AS, missed prior follow up appointment in 11/2021, seen his PCP (Dr. Caesar Cespedes in [place]) noted low BP and his cardiac meds discontinued (Lasix, clopidogrel and metoprolol), last seen 3/2022 resumed GDMT switched to losartan due to high copay for Entresto, repeat Echo 5/2022 improved LV EF 51% but with low gradient severe aortic stenosis (MUSTAPHA 0.83 cm2), patient declined surgical evaluation/TAVR, last seen 8/2022 not able to afford Entresto switched to losartan, now presents for follow up.\par \par Overall stable from cardiac standpoint with rate controlled Afib and stable CAD without anginal complains. He continues to decline TAVR workup likely minimizing symptoms as not been much active or exertional except walking inside the house. Patient appears hesitant for surgery as previously reports his older brother had suffered surgical complication in the past but explained in details that every situation is unique, given prior HF episodes and underlying CAD explained the window of opportunity for TAVR maybe limited and should not wait until he has acute decompensation, he and his son express will need further time to decide and discussion with his family. \par \par \par 1. CAD s/p impella support EMILEE to [id number] LAD during index event of NSTEMI in 7/2021\par -continue clopidogrel 75mg alone without ASA 81mg to avoid triple therapy with anticoagulation for AFib as well\par -continue atorvastatin 40mg, prior  need repeat for goal <70\par -on medically management for residual lesions of 75% LPL and RCA as without angina or recurrent HF been deferring procedure. \par \par 2. ICM/HF improved EF \par -euvolemic on Lasix 40mg, but wants dose reduction to 20mg due to frequent urination, trial of 20mg but if increase weight gain/swelling or dyspnea to resume 40mg use. \par -continue GDMT with metoprolol succinate at 50mg and losartan as not able to to afford Entresto with high copay\par -repeat Echo as been 1 year ago given severe AS to monitor LV EF. \par \par \par 3. Persistent AFIB\par -remains in Afib orate controlled on metoprolol, defer rhythm management as asymptomatic and no recurrent HF\par -CHADSVasc 4- on Eliquis 5mg BID\par \par 4. LOW GRADIENT severe AS\par -reiterated to consider TAVR eval. as if further progression to symptomatic severe AS can lead to recurrent heart failure and possible death, he will discuss again with his family and about his goals-of-care. \par \par \par Follow up in 3 months.  [EKG obtained to assist in diagnosis and management of assessed problem(s)] : EKG obtained to assist in diagnosis and management of assessed problem(s)

## 2023-05-02 NOTE — CARDIOLOGY SUMMARY
[de-identified] : 8/27/21- Afib 57, normal axis, Q-wave V1-V2, QTc 475\par 3/18/22- Afib 84, normal axis, Q-wave V1-V3, PVC, QTc 426, low voltage\par 8/5/22- Afib 66, normal axis, PVCs, Q-wave V1-V3, III/aVF, QTc 424\par 5/2/23- Afib 88, normal axis, PVC x1, Q-wave III/aVF, QTc 441 [de-identified] : 5/25/22- LV EF 51%, hypokinesis of [id number] inferoseptum, PASP 49 mmHg, severe biatrial enlargement, low gradient severe AS (MUSTAPHA 0.83 cm2), moderate MR\par \par 7/30/21- \par Summary:\par  1. Endocardial visualization was enhanced with intravenous echo contrast.\par  2. Left ventricular ejection fraction, by visual estimation, is 20 to 25%.\par  3. Severely decreased global left ventricular systolic function.\par  4. Multiple left ventricular regional wall motion abnormalities exist. See wall motion findings.\par  5. Severely enlarged left atrium.\par  6. Severely enlarged right atrium.\par  7. Mild mitral annular calcification.\par  8. Mild mitral valve regurgitation.\par  9. Moderate tricuspid regurgitation.\par 10. Peak transaortic gradient equals 14.0 mmHg, mean transaortic gradient equals 8.6 mmHg, the calculated aortic valve area equals 0.59 cm² by the continuity equation consistent with severe aortic stenosis. The Dimensionless Index value is 0.23, also consistent with severe AS.\par 11. Estimated pulmonary artery systolic pressure is 49.5 mmHg assuming a right atrial pressure of 8 mmHg, which is consistent with mild pulmonary hypertension.\par 12. Trivial pericardial effusion.\par 13. No prior studies are available for comparison. [de-identified] : 7/30/21- DIAGNOSTIC IMPRESSIONS: There is significant triple vessel coronary artery\par disease.\par Prox LAD=99% (REYNALDO 2)\par Ostial Diag=90%\par Prox LPL=75%\par Prox to Mid RCA=75% Left ventricular function is abnormal.\par 8/4/21- CORONARY VESSELS:\par LAD:   --  Mid LAD: There was a 95 % stenosis.\par COMPLICATIONS: No complications occurred during the cath lab visit.\par DIAGNOSTIC IMPRESSIONS: Successful PCI of Prox to Mid LAD with EMILEE x 1\par ([place] 2.5x38mm)\par Successful Insertion and Removal of Impella CP\par Successful RFA pre-closure with Perclose Proglide x 2

## 2023-05-02 NOTE — PHYSICAL EXAM
[Well Developed] : well developed [Well Nourished] : well nourished [No Acute Distress] : no acute distress [Normal Conjunctiva] : normal conjunctiva [Normal Venous Pressure] : normal venous pressure [No Carotid Bruit] : no carotid bruit [Normal S1, S2] : normal S1, S2 [No Murmur] : no murmur [No Rub] : no rub [No Gallop] : no gallop [Good Air Entry] : good air entry [No Respiratory Distress] : no respiratory distress  [Soft] : abdomen soft [Non Tender] : non-tender [No Masses/organomegaly] : no masses/organomegaly [Normal Bowel Sounds] : normal bowel sounds [Normal Gait] : normal gait [No Edema] : no edema [No Cyanosis] : no cyanosis [No Clubbing] : no clubbing [No Varicosities] : no varicosities [No Rash] : no rash [No Skin Lesions] : no skin lesions [Moves all extremities] : moves all extremities [No Focal Deficits] : no focal deficits [Normal Speech] : normal speech [Alert and Oriented] : alert and oriented [Normal memory] : normal memory [de-identified] : trace L-basilar crackles

## 2023-05-04 ENCOUNTER — NON-APPOINTMENT (OUTPATIENT)
Age: 88
End: 2023-05-04

## 2023-05-04 DIAGNOSIS — I42.9 CARDIOMYOPATHY, UNSPECIFIED: ICD-10-CM

## 2023-05-30 ENCOUNTER — RX RENEWAL (OUTPATIENT)
Age: 88
End: 2023-05-30

## 2023-11-07 NOTE — PROGRESS NOTE ADULT - REASON FOR ADMISSION
NSTEMI, new onset MALA, severe LV dysfunction and severe LF/LG AS
new onset Afib w RVR  NSTEMI
NSTEMI, severe LV dysfunction and new onset AF
new onset Afib w RVR  NSTEMI
Stable

## 2024-02-07 ENCOUNTER — NON-APPOINTMENT (OUTPATIENT)
Age: 89
End: 2024-02-07

## 2024-02-20 ENCOUNTER — RX RENEWAL (OUTPATIENT)
Age: 89
End: 2024-02-20

## 2024-02-26 ENCOUNTER — APPOINTMENT (OUTPATIENT)
Dept: CARDIOLOGY | Facility: CLINIC | Age: 89
End: 2024-02-26
Payer: MEDICARE

## 2024-02-26 ENCOUNTER — NON-APPOINTMENT (OUTPATIENT)
Age: 89
End: 2024-02-26

## 2024-02-26 VITALS
SYSTOLIC BLOOD PRESSURE: 132 MMHG | OXYGEN SATURATION: 94 % | HEART RATE: 51 BPM | BODY MASS INDEX: 22.51 KG/M2 | DIASTOLIC BLOOD PRESSURE: 76 MMHG | HEIGHT: 69 IN | WEIGHT: 152 LBS

## 2024-02-26 DIAGNOSIS — I48.19 OTHER PERSISTENT ATRIAL FIBRILLATION: ICD-10-CM

## 2024-02-26 DIAGNOSIS — I50.20 UNSPECIFIED SYSTOLIC (CONGESTIVE) HEART FAILURE: ICD-10-CM

## 2024-02-26 DIAGNOSIS — I35.0 NONRHEUMATIC AORTIC (VALVE) STENOSIS: ICD-10-CM

## 2024-02-26 DIAGNOSIS — I25.10 ATHEROSCLEROTIC HEART DISEASE OF NATIVE CORONARY ARTERY W/OUT ANGINA PECTORIS: ICD-10-CM

## 2024-02-26 PROCEDURE — 99215 OFFICE O/P EST HI 40 MIN: CPT

## 2024-02-26 PROCEDURE — 93000 ELECTROCARDIOGRAM COMPLETE: CPT

## 2024-02-26 PROCEDURE — G2211 COMPLEX E/M VISIT ADD ON: CPT

## 2024-02-26 RX ORDER — LOSARTAN POTASSIUM 50 MG/1
50 TABLET, FILM COATED ORAL DAILY
Qty: 90 | Refills: 3 | Status: ACTIVE | COMMUNITY
Start: 2022-05-25 | End: 1900-01-01

## 2024-02-26 RX ORDER — FUROSEMIDE 20 MG/1
20 TABLET ORAL DAILY
Qty: 90 | Refills: 3 | Status: ACTIVE | COMMUNITY
Start: 2021-10-25 | End: 1900-01-01

## 2024-02-26 RX ORDER — METOPROLOL SUCCINATE 50 MG/1
50 TABLET, EXTENDED RELEASE ORAL DAILY
Qty: 90 | Refills: 3 | Status: ACTIVE | COMMUNITY
Start: 1900-01-01 | End: 1900-01-01

## 2024-02-26 RX ORDER — CLOPIDOGREL BISULFATE 75 MG/1
75 TABLET, FILM COATED ORAL
Qty: 90 | Refills: 3 | Status: ACTIVE | COMMUNITY
Start: 2023-02-28 | End: 1900-01-01

## 2024-02-26 RX ORDER — APIXABAN 5 MG/1
5 TABLET, FILM COATED ORAL
Qty: 180 | Refills: 3 | Status: ACTIVE | COMMUNITY
Start: 2023-02-09 | End: 1900-01-01

## 2024-02-26 NOTE — HISTORY OF PRESENT ILLNESS
[FreeTextEntry1] : 89M no prior medical care >15 yrs presented to [facility] ER on 7/30/21 with intermittent chest pain and shortness of breath, NSTEMI with peak tnl 1.87, also found to be in newly diagnosed Afib RVR, TTE revealed EF 20-25%, mod TR, mild PHTN, severe low flow/low gradient AS, cath on 7/30/21 with triple vessels CAD, 99% thrombotic culprit lesion pLAD and D1 90% disease, also with 75% lesion LPL and mRCA, underwent high risk PCI with Impella support to pLAD, discharged home on 8/6/21 on Eliquis and Entresto, presented for initial outpatient cardiology visit seen on 8/2021, patient/daughter hesitant about further workup/intervention for persistent Afib and AS, missed prior follow up appointment in 11/2021, seen his PCP (Dr. Caesar Cespedes in [place]) noted low BP and his cardiac meds discontinued (Lasix, clopidogrel and metoprolol), last seen 3/2022 resumed GDMT switched to losartan due to high copay for Entresto, repeat Echo 5/2022 improved LV EF 51% but with low gradient severe aortic stenosis (MUSTAPHA 0.83 cm2), patient declined surgical evaluation/TAVR, prior visit 8/2022 not able to afford Entresto switched to losartan, last seen 5/2023 continues to decline intervention for AS, now presents for follow up.  Patient presents with his daughter for the visit, reports feeling well, however has not been active with walking mainly within the house as he is concern about imbalance gait, no fall or bleeding on Eliquis, able to afford use, has frequent urination despite reduced Lasix use. He does not have a PCP, may need to have bridge done for his recent tooth pull since lost all molars.    Prior visit 5/2023:  Patient presents with his son for the visit, he lives at home with his daughter. Reports feeling well, no shortness of breath or chest pain, no bleeding or fall, not ambulating much mostly within the house, does not go out much, complains of frequent urination with Lasix 40mg use takes around 8-9 am. Has not seen his PCP recently and has no blood work.    Prior visit 8/2022:  Patient presents with her daughter for the visit.  Has not start the losartan yet as still finishing up the Entresto. Mostly walking inside the house due to foot pain wearing slippers, does not go out to grocery shop. He is still declining TAVR procedure.   Not yet obtain COVID vaccine booster.   Recent lab 4/2022: , Cr. 1.25  Prior visit 3/2022:  Present with his daughter for the visit.  He was off clopidogrel for 1 week since resumed when his daughter reach out to my office last week. When off Lasix also noted R-foot swelling, stop going out to the mail box due to foot pain, not going outside of the house, no stairs in the house on 1st floor living area, denies chest pain or exertional dyspnea, no recent fall or bleeding, no syncope.   Not yet receive COVID vaccine booster.   Prior visit 8/2021: Patient presents with his daughter for the visit. Feeling well since discharge, remains weak and fatigue at times and afraid of getting out, but able to ambulate to walk his mailbox. No chest pain or shortness of breath. Denies any bleeding.   Completed COVID vaccine. No alcohol use Living with his daughter Does not have medicare prescription drug coverage, pending enrollment in October.

## 2024-02-26 NOTE — DISCUSSION/SUMMARY
[FreeTextEntry1] : 89M no prior medical care >15 yrs presented to [facility] ER on 7/30/21 with intermittent chest pain and shortness of breath, NSTEMI with peak tnl 1.87, also found to be in newly diagnosed Afib RVR, TTE revealed EF 20-25%, mod TR, mild PHTN, severe low flow/low gradient AS, cath on 7/30/21 with triple vessels CAD, 99% thrombotic culprit lesion pLAD and D1 90% disease, also with 75% lesion LPL and mRCA, underwent high risk PCI with Impella support to pLAD, discharged home on 8/6/21 on Eliquis and Entresto, presented for initial outpatient cardiology visit seen on 8/2021, patient/daughter hesitant about further workup/intervention for persistent Afib and AS, missed prior follow up appointment in 11/2021, seen his PCP (Dr. Caesar Cespedes in [place]) noted low BP and his cardiac meds discontinued (Lasix, clopidogrel and metoprolol), last seen 3/2022 resumed GDMT switched to losartan due to high copay for Entresto, repeat Echo 5/2022 improved LV EF 51% but with low gradient severe aortic stenosis (MUSTAPHA 0.83 cm2), patient declined surgical evaluation/TAVR, prior visit 8/2022 not able to afford Entresto switched to losartan, last seen 5/2023 continues to decline intervention for AS, now presents for follow up.  Overall stable from cardiac standpoint with rate controlled Afib and stable CAD without anginal complains. He continues to decline TAVR workup likely minimizing symptoms as not been much active or exertional except walking inside the house. Patient appears hesitant for surgery as previously reports his older brother had suffered surgical complication in the past but explained in details that every situation is unique, given prior HF episodes and underlying CAD explained the window of opportunity for TAVR maybe limited and should not wait until he has acute decompensation, he continues to express does not want any surgical intervention will continue medical management. Obtain routine blood work as without PCP.    1. CAD s/p impella support EMILEE to [id number] LAD during index event of NSTEMI in 7/2021 -continue clopidogrel 75mg alone without ASA 81mg to avoid triple therapy with anticoagulation for AFib as well -continue atorvastatin 40mg, prior  need repeat for goal <70 -on medically management for residual lesions of 75% LPL and RCA as without angina or recurrent HF been deferring procedure.   2. ICM/HF improved EF  -euvolemic on Lasix 20mg once daily -continue GDMT with metoprolol succinate at 50mg and losartan as not able to to afford Entresto with high copay -defer repeat Echo for now as stable  3. Persistent Afib -remains in Afib orate controlled on metoprolol, defer rhythm management as asymptomatic and no recurrent HF -CHADSVasc 4- on Eliquis 5mg BID  4. LOW GRADIENT severe AS -continues to decline workup and intervention   Follow up in 6  months.  [EKG obtained to assist in diagnosis and management of assessed problem(s)] : EKG obtained to assist in diagnosis and management of assessed problem(s)

## 2024-02-26 NOTE — CARDIOLOGY SUMMARY
[de-identified] : 8/27/21- Afib 57, normal axis, Q-wave V1-V2, QTc 475 3/18/22- Afib 84, normal axis, Q-wave V1-V3, PVC, QTc 426, low voltage 8/5/22- Afib 66, normal axis, PVCs, Q-wave V1-V3, III/aVF, QTc 424 5/2/23- Afib 88, normal axis, PVC x1, Q-wave III/aVF, QTc 441 2/26/24- Afib 74, Q-wave V1-V3, QTc 409 [de-identified] : 5/25/22- LV EF 51%, hypokinesis of [id number] inferoseptum, PASP 49 mmHg, severe biatrial enlargement, low gradient severe AS (MUSTAPHA 0.83 cm2), moderate MR\par  \par  7/30/21- \par  Summary:\par   1. Endocardial visualization was enhanced with intravenous echo contrast.\par   2. Left ventricular ejection fraction, by visual estimation, is 20 to 25%.\par   3. Severely decreased global left ventricular systolic function.\par   4. Multiple left ventricular regional wall motion abnormalities exist. See wall motion findings.\par   5. Severely enlarged left atrium.\par   6. Severely enlarged right atrium.\par   7. Mild mitral annular calcification.\par   8. Mild mitral valve regurgitation.\par   9. Moderate tricuspid regurgitation.\par  10. Peak transaortic gradient equals 14.0 mmHg, mean transaortic gradient equals 8.6 mmHg, the calculated aortic valve area equals 0.59 cm by the continuity equation consistent with severe aortic stenosis. The Dimensionless Index value is 0.23, also consistent with severe AS.\par  11. Estimated pulmonary artery systolic pressure is 49.5 mmHg assuming a right atrial pressure of 8 mmHg, which is consistent with mild pulmonary hypertension.\par  12. Trivial pericardial effusion.\par  13. No prior studies are available for comparison. [de-identified] : 7/30/21- DIAGNOSTIC IMPRESSIONS: There is significant triple vessel coronary artery\par  disease.\par  Prox LAD=99% (REYNALDO 2)\par  Ostial Diag=90%\par  Prox LPL=75%\par  Prox to Mid RCA=75% Left ventricular function is abnormal.\par  8/4/21- CORONARY VESSELS:\par  LAD:   --  Mid LAD: There was a 95 % stenosis.\par  COMPLICATIONS: No complications occurred during the cath lab visit.\par  DIAGNOSTIC IMPRESSIONS: Successful PCI of Prox to Mid LAD with EMILEE x 1\par  ([place] 2.5x38mm)\par  Successful Insertion and Removal of Impella CP\par  Successful RFA pre-closure with Perclose Proglide x 2

## 2024-08-06 NOTE — PHYSICAL THERAPY INITIAL EVALUATION ADULT - GAIT DISTANCE, PT EVAL
Patient arrived at clinic today for a blood pressure check.  Blood pressure taken per protocol.     Patients blood pressure was 112/66 with a pulse of 102. Patient checked her BP with her home machine after the nurse and it was 130/65 pulse 95.     Patient is currently taking amlodipine 10mg tab daily, losartan 25mg tab daily, and metoprolol 25mg 24hr tab daily.   Patient did not take any of her medications this morning.     Patient denies headache, chest pain, SOB, swelling, and dizziness.     Patient is feeling weak. Patient is currently ill with a cough. Patient went to the  yesterday and was given tessalon pearls. Patient has not picked them up yet. Patient will pick them up today. Went over care advice for a cough/cold with the patient and her daughter who was with her today.     BP Readings from Last 4 Encounters:   08/06/24 118/58   08/05/24 102/44   06/25/24 (!) 171/80   06/19/24 (!) 174/79     Patient brought in her home machine. It is a [employer] brand BP cuff.     Patients daughter is concerned about the patients BP going back up with her holding her BP medications. Nurse told the patient and daughter that Sherry Ocampo PA-C is out of the office today but will be back tomorrow. Nurse told the patient and daughter that message will be sent to the on-call provider today as well and if she has any further recommendations that we would give them a call. Nurse told them that the on-call provider will most likely defer to PCP since she will be back in the office tomorrow.     Discussed all of this with the on-call provider. Message sent to on-call and PCP.    
75 feet

## 2025-06-02 ENCOUNTER — NON-APPOINTMENT (OUTPATIENT)
Age: 89
End: 2025-06-02

## 2025-06-04 ENCOUNTER — NON-APPOINTMENT (OUTPATIENT)
Age: 89
End: 2025-06-04

## 2025-06-04 ENCOUNTER — APPOINTMENT (OUTPATIENT)
Dept: CARDIOLOGY | Facility: CLINIC | Age: 89
End: 2025-06-04
Payer: MEDICARE

## 2025-06-04 VITALS
HEIGHT: 69 IN | SYSTOLIC BLOOD PRESSURE: 132 MMHG | WEIGHT: 148 LBS | BODY MASS INDEX: 21.92 KG/M2 | OXYGEN SATURATION: 99 % | HEART RATE: 93 BPM | DIASTOLIC BLOOD PRESSURE: 80 MMHG

## 2025-06-04 DIAGNOSIS — I25.10 ATHEROSCLEROTIC HEART DISEASE OF NATIVE CORONARY ARTERY W/OUT ANGINA PECTORIS: ICD-10-CM

## 2025-06-04 DIAGNOSIS — I35.0 NONRHEUMATIC AORTIC (VALVE) STENOSIS: ICD-10-CM

## 2025-06-04 DIAGNOSIS — I48.19 OTHER PERSISTENT ATRIAL FIBRILLATION: ICD-10-CM

## 2025-06-04 DIAGNOSIS — I50.20 UNSPECIFIED SYSTOLIC (CONGESTIVE) HEART FAILURE: ICD-10-CM

## 2025-06-04 PROCEDURE — 93000 ELECTROCARDIOGRAM COMPLETE: CPT

## 2025-06-04 PROCEDURE — 99214 OFFICE O/P EST MOD 30 MIN: CPT

## 2025-06-04 PROCEDURE — G2211 COMPLEX E/M VISIT ADD ON: CPT
